# Patient Record
Sex: FEMALE | Race: WHITE
[De-identification: names, ages, dates, MRNs, and addresses within clinical notes are randomized per-mention and may not be internally consistent; named-entity substitution may affect disease eponyms.]

---

## 2017-03-08 ENCOUNTER — PRIOR ORIGINAL RECORDS (OUTPATIENT)
Dept: OTHER | Age: 77
End: 2017-03-08

## 2017-03-21 ENCOUNTER — HOSPITAL ENCOUNTER (OUTPATIENT)
Dept: CV DIAGNOSTICS | Age: 77
Discharge: HOME | End: 2017-03-21
Attending: INTERNAL MEDICINE
Payer: MEDICARE

## 2017-03-21 ENCOUNTER — MYAURORA ACCOUNT LINK (OUTPATIENT)
Dept: OTHER | Age: 77
End: 2017-03-21

## 2017-03-21 DIAGNOSIS — I65.21 OCCLUSION OF RIGHT CAROTID ARTERY: ICD-10-CM

## 2017-03-29 ENCOUNTER — PRIOR ORIGINAL RECORDS (OUTPATIENT)
Dept: OTHER | Age: 77
End: 2017-03-29

## 2017-03-30 ENCOUNTER — PRIOR ORIGINAL RECORDS (OUTPATIENT)
Dept: OTHER | Age: 77
End: 2017-03-30

## 2017-04-04 ENCOUNTER — PRIOR ORIGINAL RECORDS (OUTPATIENT)
Dept: OTHER | Age: 77
End: 2017-04-04

## 2017-04-05 ENCOUNTER — PRIOR ORIGINAL RECORDS (OUTPATIENT)
Dept: OTHER | Age: 77
End: 2017-04-05

## 2017-04-05 ENCOUNTER — LAB ENCOUNTER (OUTPATIENT)
Dept: LAB | Age: 77
End: 2017-04-05
Attending: INTERNAL MEDICINE
Payer: MEDICARE

## 2017-04-05 DIAGNOSIS — E78.00 HYPERCHOLESTEREMIA: ICD-10-CM

## 2017-04-05 DIAGNOSIS — I77.9 CAROTID ARTERY DISEASE (HCC): Primary | ICD-10-CM

## 2017-04-05 DIAGNOSIS — R73.9 HYPERGLYCEMIA: ICD-10-CM

## 2017-04-05 PROCEDURE — 80061 LIPID PANEL: CPT

## 2017-04-05 PROCEDURE — 80053 COMPREHEN METABOLIC PANEL: CPT

## 2017-04-05 PROCEDURE — 36415 COLL VENOUS BLD VENIPUNCTURE: CPT

## 2017-04-13 LAB
ALBUMIN: 4.1 G/DL
ALKALINE PHOSPHATATE(ALK PHOS): 74 IU/L
BILIRUBIN TOTAL: 0.5 MG/DL
BUN: 11 MG/DL
CALCIUM: 9.2 MG/DL
CHLORIDE: 104 MEQ/L
CHOLESTEROL, TOTAL: 144 MG/DL
CREATININE, SERUM: 0.7 MG/DL
GLUCOSE: 101 MG/DL
HDL CHOLESTEROL: 62 MG/DL
LDL CHOLESTEROL: 59 MG/DL
POTASSIUM, SERUM: 3.7 MEQ/L
PROTEIN, TOTAL: 7.6 G/DL
SGOT (AST): 30 IU/L
SGPT (ALT): 32 IU/L
SODIUM: 141 MEQ/L
TRIGLYCERIDES: 115 MG/DL

## 2017-05-08 ENCOUNTER — PRIOR ORIGINAL RECORDS (OUTPATIENT)
Dept: OTHER | Age: 77
End: 2017-05-08

## 2017-06-28 ENCOUNTER — PRIOR ORIGINAL RECORDS (OUTPATIENT)
Dept: OTHER | Age: 77
End: 2017-06-28

## 2017-06-29 ENCOUNTER — HOSPITAL ENCOUNTER (OUTPATIENT)
Dept: MAMMOGRAPHY | Age: 77
Discharge: HOME | End: 2017-06-29
Attending: OBSTETRICS & GYNECOLOGY
Payer: MEDICARE

## 2017-06-29 DIAGNOSIS — Z12.31 VISIT FOR SCREENING MAMMOGRAM: ICD-10-CM

## 2017-06-29 PROCEDURE — 77067 SCR MAMMO BI INCL CAD: CPT | Performed by: OBSTETRICS & GYNECOLOGY

## 2017-07-10 ENCOUNTER — HOSPITAL ENCOUNTER (OUTPATIENT)
Dept: MAMMOGRAPHY | Age: 77
Discharge: HOME | End: 2017-07-10
Attending: OBSTETRICS & GYNECOLOGY
Payer: MEDICARE

## 2017-07-10 DIAGNOSIS — R92.2 INCONCLUSIVE MAMMOGRAM: ICD-10-CM

## 2017-07-10 PROCEDURE — 77061 BREAST TOMOSYNTHESIS UNI: CPT | Performed by: OBSTETRICS & GYNECOLOGY

## 2017-07-10 PROCEDURE — 77065 DX MAMMO INCL CAD UNI: CPT | Performed by: OBSTETRICS & GYNECOLOGY

## 2017-07-10 NOTE — IMAGING NOTE
Assisted  with Recommendation for a 2 site stereotactic right breast biopsy for calcifications. Emotional and educational support provided. Written information provided to [employer]  and she verbalized understanding.

## 2017-07-11 ENCOUNTER — PRIOR ORIGINAL RECORDS (OUTPATIENT)
Dept: OTHER | Age: 77
End: 2017-07-11

## 2017-07-11 ENCOUNTER — TELEPHONE (OUTPATIENT)
Dept: MAMMOGRAPHY | Facility: HOSPITAL | Age: 77
End: 2017-07-11

## 2017-07-11 NOTE — TELEPHONE ENCOUNTER
LM with Dr. Breanna Caba office regarding Holding ASA for a 2 site stereotactic breast biopsy. We recommend holding 5 days before and 2 days after if this is amenable to him? Awaiting call back in order to schedule.

## 2017-07-11 NOTE — TELEPHONE ENCOUNTER
Per Chris Brenner, NP with Cardiology (Dr. Mae Brown) \"pt may stop ASA for 3 days before biopsy and restart ASAP. \"  Per Bernadine Figueredo with our scheduling department, we are still waiting on breast biopsy orders.  I notified pt of above and LM asking her to contact her PCP f

## 2017-07-13 ENCOUNTER — PRIOR ORIGINAL RECORDS (OUTPATIENT)
Dept: OTHER | Age: 77
End: 2017-07-13

## 2017-07-31 ENCOUNTER — HOSPITAL ENCOUNTER (OUTPATIENT)
Dept: MAMMOGRAPHY | Facility: HOSPITAL | Age: 77
Discharge: HOME | End: 2017-07-31
Attending: OBSTETRICS & GYNECOLOGY
Payer: MEDICARE

## 2017-07-31 DIAGNOSIS — R92.1 BREAST CALCIFICATIONS: ICD-10-CM

## 2017-07-31 PROCEDURE — 19081 BX BREAST 1ST LESION STRTCTC: CPT | Performed by: OBSTETRICS & GYNECOLOGY

## 2017-07-31 PROCEDURE — 88305 TISSUE EXAM BY PATHOLOGIST: CPT

## 2017-07-31 PROCEDURE — 19082 BX BREAST ADD LESION STRTCTC: CPT | Performed by: OBSTETRICS & GYNECOLOGY

## 2017-07-31 PROCEDURE — 88360 TUMOR IMMUNOHISTOCHEM/MANUAL: CPT

## 2017-07-31 NOTE — PROCEDURES
PROCEDURE: [facility] BIOPSY STEREOTACTIC W/CALC 2 SITE RIGHT    COMPARISON: [place], [facility] STEPHANIE 2D+3D DIAGNOSTIC ADDL VWS RIGHT (KVQ=[id number]), 7/10/2017, 9:16.     INDICATIONS: R92.1 Mammographic calcification found on diagnostic imaging of breast    DESCRIP

## 2017-08-02 ENCOUNTER — TELEPHONE (OUTPATIENT)
Dept: MAMMOGRAPHY | Facility: HOSPITAL | Age: 77
End: 2017-08-02

## 2017-08-02 NOTE — TELEPHONE ENCOUNTER
Telephoned [address] and name,  verified with pt. Notified [facility] [address] [facility] of Right ST breast biopsy result of 2 areas of DCIS. Emotional support given. Pt sts she's glad she had the breast checkup done to find this early.   Pt's GYN/order

## 2017-08-03 ENCOUNTER — TELEPHONE (OUTPATIENT)
Dept: HEMATOLOGY/ONCOLOGY | Facility: HOSPITAL | Age: 77
End: 2017-08-03

## 2017-08-03 NOTE — TELEPHONE ENCOUNTER
Patient and her Daughter in [place] phoned to schedule breast clinic appointments for newly diagnosed DCIS. Introduced myself and explained my role as the breast navigator.  Explained the role of the physicians on her breast cancer care team.  Briefly

## 2017-08-08 ENCOUNTER — OFFICE VISIT (OUTPATIENT)
Dept: SURGERY | Facility: CLINIC | Age: 77
End: 2017-08-08

## 2017-08-08 ENCOUNTER — OFFICE VISIT (OUTPATIENT)
Dept: HEMATOLOGY/ONCOLOGY | Facility: HOSPITAL | Age: 77
End: 2017-08-08
Attending: INTERNAL MEDICINE
Payer: MEDICARE

## 2017-08-08 VITALS
BODY MASS INDEX: 22.54 KG/M2 | SYSTOLIC BLOOD PRESSURE: 157 MMHG | HEART RATE: 75 BPM | TEMPERATURE: 98 F | HEIGHT: 63 IN | WEIGHT: 127.19 LBS | DIASTOLIC BLOOD PRESSURE: 66 MMHG | OXYGEN SATURATION: 98 % | RESPIRATION RATE: 16 BRPM

## 2017-08-08 DIAGNOSIS — D05.11 DUCTAL CARCINOMA IN SITU (DCIS) OF RIGHT BREAST: Primary | ICD-10-CM

## 2017-08-08 DIAGNOSIS — Z80.3 FAMILY HISTORY OF BREAST CANCER IN FEMALE: ICD-10-CM

## 2017-08-08 PROCEDURE — 99211 OFF/OP EST MAY X REQ PHY/QHP: CPT

## 2017-08-08 PROCEDURE — 99204 OFFICE O/P NEW MOD 45 MIN: CPT | Performed by: SURGERY

## 2017-08-08 RX ORDER — GUARN/MA-HUANG/P.GIN/S.GINSENG
1 TABLET ORAL DAILY
COMMUNITY

## 2017-08-08 RX ORDER — EZETIMIBE AND SIMVASTATIN 10; 20 MG/1; MG/1
1 TABLET ORAL NIGHTLY
COMMUNITY
End: 2018-03-20

## 2017-08-08 RX ORDER — AMLODIPINE BESYLATE 5 MG/1
5 TABLET ORAL DAILY
Status: ON HOLD | COMMUNITY
End: 2018-03-21

## 2017-08-08 RX ORDER — GLUCOSAMINE/D3/BOSWELLIA SERRA 1500MG-400
10000 TABLET ORAL DAILY
COMMUNITY

## 2017-08-08 RX ORDER — DOXEPIN HYDROCHLORIDE 50 MG/1
1 CAPSULE ORAL DAILY
COMMUNITY

## 2017-08-09 ENCOUNTER — TELEPHONE (OUTPATIENT)
Dept: SURGERY | Facility: CLINIC | Age: 77
End: 2017-08-09

## 2017-08-09 DIAGNOSIS — D05.11 DUCTAL CARCINOMA IN SITU (DCIS) OF RIGHT BREAST: Primary | ICD-10-CM

## 2017-08-10 ENCOUNTER — PRIOR ORIGINAL RECORDS (OUTPATIENT)
Dept: OTHER | Age: 77
End: 2017-08-10

## 2017-08-10 RX ORDER — LIDOCAINE AND PRILOCAINE 25; 25 MG/G; MG/G
CREAM TOPICAL ONCE
Status: CANCELLED | OUTPATIENT
Start: 2017-08-10 | End: 2017-08-10

## 2017-08-14 ENCOUNTER — APPOINTMENT (OUTPATIENT)
Dept: LAB | Age: 77
End: 2017-08-14
Payer: MEDICARE

## 2017-08-14 DIAGNOSIS — D05.11 DUCTAL CARCINOMA IN SITU (DCIS) OF RIGHT BREAST: ICD-10-CM

## 2017-08-14 LAB
ATRIAL RATE: 62 BPM
BUN BLD-MCNC: 11 MG/DL (ref 8–20)
CALCIUM BLD-MCNC: 9 MG/DL (ref 8.3–10.3)
CHLORIDE: 105 MMOL/L (ref 101–111)
CO2: 28 MMOL/L (ref 22–32)
CREAT BLD-MCNC: 0.65 MG/DL (ref 0.55–1.02)
GLUCOSE BLD-MCNC: 103 MG/DL (ref 70–99)
P AXIS: 56 DEGREES
P-R INTERVAL: 184 MS
POTASSIUM SERPL-SCNC: 3.8 MMOL/L (ref 3.6–5.1)
Q-T INTERVAL: 406 MS
QRS DURATION: 82 MS
QTC CALCULATION (BEZET): 412 MS
R AXIS: -22 DEGREES
SODIUM SERPL-SCNC: 140 MMOL/L (ref 136–144)
T AXIS: 75 DEGREES
VENTRICULAR RATE: 62 BPM

## 2017-08-14 PROCEDURE — 93010 ELECTROCARDIOGRAM REPORT: CPT | Performed by: INTERNAL MEDICINE

## 2017-08-14 PROCEDURE — 93005 ELECTROCARDIOGRAM TRACING: CPT

## 2017-08-14 PROCEDURE — 80048 BASIC METABOLIC PNL TOTAL CA: CPT

## 2017-08-14 PROCEDURE — 36415 COLL VENOUS BLD VENIPUNCTURE: CPT

## 2017-08-23 ENCOUNTER — TELEPHONE (OUTPATIENT)
Dept: MAMMOGRAPHY | Facility: HOSPITAL | Age: 77
End: 2017-08-23

## 2017-08-23 NOTE — TELEPHONE ENCOUNTER
Spoke with Vanessa Currie regarding [place] Lymph Node mapping which will be done in nuclear medicine department before right mastectomy surgery scheduled for 8/25/17 with Dr Julianne Hill. Procedure explained and all questions answered.   Breast Care Coordin

## 2017-08-25 ENCOUNTER — ANESTHESIA EVENT (OUTPATIENT)
Dept: SURGERY | Facility: HOSPITAL | Age: 77
End: 2017-08-25
Payer: MEDICARE

## 2017-08-25 ENCOUNTER — ANESTHESIA (OUTPATIENT)
Dept: SURGERY | Facility: HOSPITAL | Age: 77
End: 2017-08-25
Payer: MEDICARE

## 2017-08-25 ENCOUNTER — HOSPITAL ENCOUNTER (OUTPATIENT)
Dept: NUCLEAR MEDICINE | Facility: HOSPITAL | Age: 77
Discharge: HOME | End: 2017-08-25
Attending: SURGERY
Payer: MEDICARE

## 2017-08-25 ENCOUNTER — SURGERY (OUTPATIENT)
Age: 77
End: 2017-08-25

## 2017-08-25 ENCOUNTER — HOSPITAL ENCOUNTER (OUTPATIENT)
Facility: HOSPITAL | Age: 77
Discharge: HOME | End: 2017-08-26
Attending: SURGERY | Admitting: SURGERY
Payer: MEDICARE

## 2017-08-25 DIAGNOSIS — D05.11 DUCTAL CARCINOMA IN SITU (DCIS) OF RIGHT BREAST: Primary | ICD-10-CM

## 2017-08-25 DIAGNOSIS — D05.11 DUCTAL CARCINOMA IN SITU (DCIS) OF RIGHT BREAST: ICD-10-CM

## 2017-08-25 PROCEDURE — 99204 OFFICE O/P NEW MOD 45 MIN: CPT | Performed by: HOSPITALIST

## 2017-08-25 PROCEDURE — 0HTT0ZZ RESECTION OF RIGHT BREAST, OPEN APPROACH: ICD-10-PCS | Performed by: SURGERY

## 2017-08-25 PROCEDURE — 07B50ZX EXCISION OF RIGHT AXILLARY LYMPHATIC, OPEN APPROACH, DIAGNOSTIC: ICD-10-PCS | Performed by: SURGERY

## 2017-08-25 PROCEDURE — 78195 LYMPH SYSTEM IMAGING: CPT | Performed by: SURGERY

## 2017-08-25 PROCEDURE — 3E0W3KZ INTRODUCTION OF OTHER DIAGNOSTIC SUBSTANCE INTO LYMPHATICS, PERCUTANEOUS APPROACH: ICD-10-PCS | Performed by: SURGERY

## 2017-08-25 RX ORDER — DOCUSATE SODIUM 100 MG/1
100 CAPSULE, LIQUID FILLED ORAL 2 TIMES DAILY
Status: DISCONTINUED | OUTPATIENT
Start: 2017-08-25 | End: 2017-08-26

## 2017-08-25 RX ORDER — ONDANSETRON 2 MG/ML
4 INJECTION INTRAMUSCULAR; INTRAVENOUS EVERY 6 HOURS PRN
Status: DISCONTINUED | OUTPATIENT
Start: 2017-08-25 | End: 2017-08-26

## 2017-08-25 RX ORDER — SODIUM PHOSPHATE, DIBASIC AND SODIUM PHOSPHATE, MONOBASIC 7; 19 G/133ML; G/133ML
1 ENEMA RECTAL ONCE AS NEEDED
Status: DISCONTINUED | OUTPATIENT
Start: 2017-08-25 | End: 2017-08-26

## 2017-08-25 RX ORDER — HYDROMORPHONE HYDROCHLORIDE 1 MG/ML
0.4 INJECTION, SOLUTION INTRAMUSCULAR; INTRAVENOUS; SUBCUTANEOUS EVERY 2 HOUR PRN
Status: DISCONTINUED | OUTPATIENT
Start: 2017-08-25 | End: 2017-08-26

## 2017-08-25 RX ORDER — NALOXONE HYDROCHLORIDE 0.4 MG/ML
80 INJECTION, SOLUTION INTRAMUSCULAR; INTRAVENOUS; SUBCUTANEOUS AS NEEDED
Status: DISCONTINUED | OUTPATIENT
Start: 2017-08-25 | End: 2017-08-25 | Stop reason: HOSPADM

## 2017-08-25 RX ORDER — DEXAMETHASONE SODIUM PHOSPHATE 4 MG/ML
4 VIAL (ML) INJECTION AS NEEDED
Status: DISCONTINUED | OUTPATIENT
Start: 2017-08-25 | End: 2017-08-25 | Stop reason: HOSPADM

## 2017-08-25 RX ORDER — BISACODYL 10 MG
10 SUPPOSITORY, RECTAL RECTAL
Status: DISCONTINUED | OUTPATIENT
Start: 2017-08-25 | End: 2017-08-26

## 2017-08-25 RX ORDER — SODIUM CHLORIDE, SODIUM LACTATE, POTASSIUM CHLORIDE, CALCIUM CHLORIDE 600; 310; 30; 20 MG/100ML; MG/100ML; MG/100ML; MG/100ML
INJECTION, SOLUTION INTRAVENOUS CONTINUOUS
Status: DISCONTINUED | OUTPATIENT
Start: 2017-08-25 | End: 2017-08-25

## 2017-08-25 RX ORDER — ACETAMINOPHEN 325 MG/1
650 TABLET ORAL EVERY 4 HOURS PRN
Status: DISCONTINUED | OUTPATIENT
Start: 2017-08-25 | End: 2017-08-26

## 2017-08-25 RX ORDER — HYDROCODONE BITARTRATE AND ACETAMINOPHEN 5; 325 MG/1; MG/1
1 TABLET ORAL EVERY 4 HOURS PRN
Status: DISCONTINUED | OUTPATIENT
Start: 2017-08-25 | End: 2017-08-26

## 2017-08-25 RX ORDER — MAGNESIUM OXIDE 400 MG (241.3 MG MAGNESIUM) TABLET
1 TABLET NIGHTLY
Status: DISCONTINUED | OUTPATIENT
Start: 2017-08-25 | End: 2017-08-26

## 2017-08-25 RX ORDER — HYDROMORPHONE HYDROCHLORIDE 1 MG/ML
0.8 INJECTION, SOLUTION INTRAMUSCULAR; INTRAVENOUS; SUBCUTANEOUS EVERY 2 HOUR PRN
Status: DISCONTINUED | OUTPATIENT
Start: 2017-08-25 | End: 2017-08-26

## 2017-08-25 RX ORDER — LIDOCAINE AND PRILOCAINE 25; 25 MG/G; MG/G
CREAM TOPICAL ONCE
Status: COMPLETED | OUTPATIENT
Start: 2017-08-25 | End: 2017-08-25

## 2017-08-25 RX ORDER — POLYETHYLENE GLYCOL 3350 17 G/17G
17 POWDER, FOR SOLUTION ORAL DAILY PRN
Status: DISCONTINUED | OUTPATIENT
Start: 2017-08-25 | End: 2017-08-26

## 2017-08-25 RX ORDER — HEPARIN SODIUM 5000 [USP'U]/ML
5000 INJECTION, SOLUTION INTRAVENOUS; SUBCUTANEOUS ONCE
Status: COMPLETED | OUTPATIENT
Start: 2017-08-25 | End: 2017-08-25

## 2017-08-25 RX ORDER — HYDROCODONE BITARTRATE AND ACETAMINOPHEN 5; 325 MG/1; MG/1
2 TABLET ORAL EVERY 4 HOURS PRN
Status: DISCONTINUED | OUTPATIENT
Start: 2017-08-25 | End: 2017-08-26

## 2017-08-25 RX ORDER — BUPIVACAINE HYDROCHLORIDE 5 MG/ML
INJECTION, SOLUTION EPIDURAL; INTRACAUDAL AS NEEDED
Status: DISCONTINUED | OUTPATIENT
Start: 2017-08-25 | End: 2017-08-25 | Stop reason: HOSPADM

## 2017-08-25 RX ORDER — HYDROMORPHONE HYDROCHLORIDE 1 MG/ML
0.4 INJECTION, SOLUTION INTRAMUSCULAR; INTRAVENOUS; SUBCUTANEOUS EVERY 5 MIN PRN
Status: DISCONTINUED | OUTPATIENT
Start: 2017-08-25 | End: 2017-08-25 | Stop reason: HOSPADM

## 2017-08-25 RX ORDER — AMLODIPINE BESYLATE 5 MG/1
5 TABLET ORAL DAILY
Status: DISCONTINUED | OUTPATIENT
Start: 2017-08-26 | End: 2017-08-26

## 2017-08-25 RX ORDER — ONDANSETRON 2 MG/ML
4 INJECTION INTRAMUSCULAR; INTRAVENOUS AS NEEDED
Status: DISCONTINUED | OUTPATIENT
Start: 2017-08-25 | End: 2017-08-25 | Stop reason: HOSPADM

## 2017-08-25 RX ORDER — SODIUM CHLORIDE 9 MG/ML
INJECTION, SOLUTION INTRAVENOUS CONTINUOUS
Status: DISCONTINUED | OUTPATIENT
Start: 2017-08-25 | End: 2017-08-26

## 2017-08-25 RX ORDER — LABETALOL HYDROCHLORIDE 5 MG/ML
5 INJECTION, SOLUTION INTRAVENOUS EVERY 5 MIN PRN
Status: DISCONTINUED | OUTPATIENT
Start: 2017-08-25 | End: 2017-08-25 | Stop reason: HOSPADM

## 2017-08-25 RX ORDER — LIDOCAINE HYDROCHLORIDE 10 MG/ML
INJECTION, SOLUTION INFILTRATION; PERINEURAL AS NEEDED
Status: DISCONTINUED | OUTPATIENT
Start: 2017-08-25 | End: 2017-08-25 | Stop reason: HOSPADM

## 2017-08-25 RX ORDER — CLINDAMYCIN PHOSPHATE 900 MG/50ML
900 INJECTION INTRAVENOUS ONCE
Status: DISCONTINUED | OUTPATIENT
Start: 2017-08-25 | End: 2017-08-25 | Stop reason: HOSPADM

## 2017-08-25 RX ORDER — ISOSULFAN BLUE 50 MG/5ML
INJECTION, SOLUTION SUBCUTANEOUS AS NEEDED
Status: DISCONTINUED | OUTPATIENT
Start: 2017-08-25 | End: 2017-08-25 | Stop reason: HOSPADM

## 2017-08-25 RX ORDER — DIAZEPAM 5 MG/1
5 TABLET ORAL AS NEEDED
Status: DISCONTINUED | OUTPATIENT
Start: 2017-08-25 | End: 2017-08-25 | Stop reason: HOSPADM

## 2017-08-25 RX ORDER — HYDROMORPHONE HYDROCHLORIDE 1 MG/ML
0.2 INJECTION, SOLUTION INTRAMUSCULAR; INTRAVENOUS; SUBCUTANEOUS EVERY 2 HOUR PRN
Status: DISCONTINUED | OUTPATIENT
Start: 2017-08-25 | End: 2017-08-26

## 2017-08-25 NOTE — INTERVAL H&P NOTE
Pre-op Diagnosis: Ductal carcinoma in situ (DCIS) of right breast [D05.11]    The above referenced H&P was reviewed by Katy Billings MD on 8/25/2017, the patient was examined and no significant changes have occurred in the patient's condition since the [facility] (DP/SNF)

## 2017-08-25 NOTE — BRIEF OP NOTE
Pre-Operative Diagnosis: Ductal carcinoma in situ (DCIS) of right breast [D05.11]     Post-Operative Diagnosis: Ductal carcinoma in situ (DCIS) of right breast [D05.11]- 3 sentinel LN negatice     Procedure Performed:   Procedure(s):  RIGHT BREAST MASTE

## 2017-08-25 NOTE — ANESTHESIA POSTPROCEDURE EVALUATION
[address] Patient Status:  Outpatient in a Bed   Age/Gender 69year old female MRN [id number]   Location [address] [facility] Attending Serenity Deal MD   Hosp Day # 0 PCP Rosa Barber       Anesthesia

## 2017-08-25 NOTE — H&P
History & Physical Examination    Patient Name: Naldo Segal  MRN: [id number]  [facility]: [id number]  YOB: 1940    Diagnosis: right breast cancer    Present Illness: The pt is a 69year old female recetly diagnosed with right breast cancer.   Mark Parekh Essential hypertension    • Familial tremor    • Hyperlipidemia    • Osteoarthritis    • Visual impairment      Past Surgical History:  No date: HYSTERECTOMY  1990'S: DARREL BIOPSY STEREOTACTIC NODULE 1 SITE RIGHT      Comment: BENIGN  No date: TONSILLECTOMY

## 2017-08-25 NOTE — IMAGING NOTE
Assisted nuclear med tech with SLN mapping of right breast for Malou Shabazz today. Procedure explained and Malou Shabazz verbalized understanding. Emotional support provided. Written and verbal education given re lymphedema and breast cancer.

## 2017-08-25 NOTE — OPERATIVE REPORT
[facility]   Op Note    [facility] Location: OR   [facility] [id number] MRN [id number]   Admission Date 8/25/2017 Operation Date 8/25/2017   Attending Physician Sho Herron MD Operating Physician Brigida Houston MD     Date of procedure:   August medical oncology to discuss adjuvant treatment options including hormone therapy. Reconstruction options were also reviewed in detail and at this time Steven Dodge has declined all reconstruction options.   The patient does not have any further questions at t maximal impulse. The subcutaneous tissue was dissected down with electrocautery and a self-retaining retractor was placed into the wound.   The blue dye staining and the gamma probe were used to identify the sentinel lymph nodes and these were sent for fro

## 2017-08-25 NOTE — ANESTHESIA PREPROCEDURE EVALUATION
PRE-OP EVALUATION    Patient Name: Juana Mora    Pre-op Diagnosis: Ductal carcinoma in situ (DCIS) of right breast [D05.11]    Procedure(s):  RIGHT BREAST MASTECTOMY, SENTINEL LYMPH NODE BIOPSY, POSSIBLE TOTAL AXILLARY LYMPH NODE DISSECTION, [place] [facility]    Negative pulmonary ROS. Neuro/Psych    Negative neuro/psych ROS.                           Breast ca      Past Surgical History:  No date: HYSTERECTOMY  1990'S: DARREL BIOPSY STEREOTACTIC NODULE 1 SITE RIGHT      Comment: B

## 2017-08-26 VITALS
DIASTOLIC BLOOD PRESSURE: 42 MMHG | BODY MASS INDEX: 21.44 KG/M2 | SYSTOLIC BLOOD PRESSURE: 114 MMHG | HEART RATE: 71 BPM | TEMPERATURE: 98 F | WEIGHT: 121 LBS | RESPIRATION RATE: 18 BRPM | OXYGEN SATURATION: 98 % | HEIGHT: 63 IN

## 2017-08-26 LAB
BASOPHILS # BLD AUTO: 0 X10(3) UL (ref 0–0.1)
BASOPHILS NFR BLD AUTO: 0 %
BUN BLD-MCNC: 11 MG/DL (ref 8–20)
CALCIUM BLD-MCNC: 8.8 MG/DL (ref 8.3–10.3)
CHLORIDE: 107 MMOL/L (ref 101–111)
CO2: 26 MMOL/L (ref 22–32)
CREAT BLD-MCNC: 0.52 MG/DL (ref 0.55–1.02)
EOSINOPHIL # BLD AUTO: 0 X10(3) UL (ref 0–0.3)
EOSINOPHIL NFR BLD AUTO: 0 %
ERYTHROCYTE [DISTWIDTH] IN BLOOD BY AUTOMATED COUNT: 13.2 % (ref 11.5–16)
GLUCOSE BLD-MCNC: 126 MG/DL (ref 70–99)
HCT VFR BLD AUTO: 33.2 % (ref 34–50)
HGB BLD-MCNC: 10.9 G/DL (ref 12–16)
IMMATURE GRANULOCYTE COUNT: 0.04 X10(3) UL (ref 0–1)
IMMATURE GRANULOCYTE RATIO %: 0.6 %
LYMPHOCYTES # BLD AUTO: 0.96 X10(3) UL (ref 0.9–4)
LYMPHOCYTES NFR BLD AUTO: 13.9 %
MCH RBC QN AUTO: 28.1 PG (ref 27–33.2)
MCHC RBC AUTO-ENTMCNC: 32.8 G/DL (ref 31–37)
MCV RBC AUTO: 85.6 FL (ref 81–100)
MONOCYTES # BLD AUTO: 0.26 X10(3) UL (ref 0.1–0.6)
MONOCYTES NFR BLD AUTO: 3.8 %
NEUTROPHIL ABS PRELIM: 5.66 X10 (3) UL (ref 1.3–6.7)
NEUTROPHILS # BLD AUTO: 5.66 X10(3) UL (ref 1.3–6.7)
NEUTROPHILS NFR BLD AUTO: 81.7 %
PLATELET # BLD AUTO: 225 10(3)UL (ref 150–450)
POTASSIUM SERPL-SCNC: 4 MMOL/L (ref 3.6–5.1)
RBC # BLD AUTO: 3.88 X10(6)UL (ref 3.8–5.1)
RED CELL DISTRIBUTION WIDTH-SD: 40.9 FL (ref 35.1–46.3)
SODIUM SERPL-SCNC: 139 MMOL/L (ref 136–144)
WBC # BLD AUTO: 6.9 X10(3) UL (ref 4–13)

## 2017-08-26 PROCEDURE — 99213 OFFICE O/P EST LOW 20 MIN: CPT | Performed by: HOSPITALIST

## 2017-08-26 RX ORDER — HYDROCODONE BITARTRATE AND ACETAMINOPHEN 5; 325 MG/1; MG/1
1 TABLET ORAL EVERY 4 HOURS PRN
Qty: 30 TABLET | Refills: 0 | Status: SHIPPED | OUTPATIENT
Start: 2017-08-26 | End: 2017-09-01 | Stop reason: ALTCHOICE

## 2017-08-26 NOTE — CONSULTS
[place] Patient Status:  Outpatient in a Bed    1940 MRN [id number]   [facility] 3NW-A Attending Keith Chavez MD   Hosp Day # 0 PCP Brandon Munguia     Reason for consult: Hypertension Disp:  Rfl:    AmLODIPine Besylate 5 MG Oral Tab Take 5 mg by mouth daily. Disp:  Rfl:    B Complex Vitamins (VITAMIN B COMPLEX OR) Inject as directed. Disp:  Rfl:    Biotin 44312 MCG Oral Tab Take by mouth.  Disp:  Rfl:    Calcium 600-200 MG-UNIT Oral Tab hypertension  1. Norvasc  3. Dyslipidemia  1. Vytorin    Quality:  · DVT Prophylaxis: SCDs    Plan of care discussed with patient and family.     Kevan Antonio MD  8/25/2017

## 2017-08-26 NOTE — CM/SW NOTE
DHAVAL received a page indicating the pt had questions regarding her dc. Pt stated she did not have an needs and her daughter would be there fin 30 to 40 minutes. Pt stated she would need bras because of her SX, but stated she knows how to obtain them.  DHAVAL berger

## 2017-08-26 NOTE — PROGRESS NOTES
[facility]  Progress Note     Elise Morales Patient Status:  Observation    1940 MRN [id number]   [facility] 3NW-A Attending Markus Pop MD   Hosp Day # 0 PCP Martin Alejandro     Chief Complaint: HTN, medical managemen no  · Central line: no    Estimated date of discharge: 8/26  Discharge is dependent on: progress  At this point Ms. Kizzy Wells is expected to be discharge to: home    Plan of care discussed with patient and RN.     Tony Flaherty MD

## 2017-08-26 NOTE — PAYOR COMM NOTE
--------------  ADMISSION REVIEW           8/25    DIRECT FOR OR    Pre-Operative Diagnosis: Ductal carcinoma in situ (DCIS) of right breast [D05.11]     Post-Operative Diagnosis: Ductal carcinoma in situ (DCIS) of right breast [D05.11]- 3 sentinel LN nega

## 2017-08-28 NOTE — PAYOR COMM NOTE
Per Surgical Scheduling Order:  ID: [id number]  Performing Location:  [address] [place] [address] OR  Performing Provider:  Barbie Wilkerson  Patient Class:   Outpatient in a Bed [123]  Date:  8/25/2017  Laterality:  Right [2]  Procedure Comments:  RIGHT BREAST MASTECTOMY, SENTI

## 2017-09-01 ENCOUNTER — OFFICE VISIT (OUTPATIENT)
Dept: SURGERY | Facility: CLINIC | Age: 77
End: 2017-09-01

## 2017-09-01 VITALS
RESPIRATION RATE: 16 BRPM | BODY MASS INDEX: 22.5 KG/M2 | DIASTOLIC BLOOD PRESSURE: 80 MMHG | HEART RATE: 62 BPM | SYSTOLIC BLOOD PRESSURE: 156 MMHG | WEIGHT: 127 LBS | HEIGHT: 63 IN

## 2017-09-01 DIAGNOSIS — Z80.3 FAMILY HISTORY OF BREAST CANCER IN FEMALE: ICD-10-CM

## 2017-09-01 DIAGNOSIS — Z90.11 S/P RIGHT MASTECTOMY: ICD-10-CM

## 2017-09-01 DIAGNOSIS — D05.11 DUCTAL CARCINOMA IN SITU (DCIS) OF RIGHT BREAST: Primary | ICD-10-CM

## 2017-09-01 PROCEDURE — 99024 POSTOP FOLLOW-UP VISIT: CPT | Performed by: SURGERY

## 2017-09-14 ENCOUNTER — OFFICE VISIT (OUTPATIENT)
Dept: SURGERY | Facility: CLINIC | Age: 77
End: 2017-09-14

## 2017-09-14 VITALS
HEIGHT: 63 IN | HEART RATE: 68 BPM | BODY MASS INDEX: 21.62 KG/M2 | WEIGHT: 122 LBS | TEMPERATURE: 98 F | SYSTOLIC BLOOD PRESSURE: 145 MMHG | DIASTOLIC BLOOD PRESSURE: 73 MMHG

## 2017-09-14 DIAGNOSIS — Z85.3 HISTORY OF CANCER OF RIGHT BREAST: Primary | ICD-10-CM

## 2017-09-14 DIAGNOSIS — Z90.11 S/P RIGHT MASTECTOMY: ICD-10-CM

## 2017-09-14 PROCEDURE — 99024 POSTOP FOLLOW-UP VISIT: CPT | Performed by: SURGERY

## 2017-09-15 NOTE — PROGRESS NOTES
GENERAL SURGERY    Khurram Roper MD, FACS, Antolin Baez. Juana Mendoza MD, Ethel Ponce MD, FACS  Shannon Johnson.  Keysha Will MD, Altaftad Mcgillllor, MD, ANTHONY Lopez PA-C         [address] [facility]

## 2017-09-17 PROBLEM — Z80.3 FAMILY HISTORY OF BREAST CANCER IN FEMALE: Status: ACTIVE | Noted: 2017-09-17

## 2017-09-17 NOTE — H&P
New Patient  Johnna Ludwig  5/8/1940 8/8/2017    This patient was referred by Debo Rivera for evaluation and consultation for new diagnosis of right breast cancer.     Chief Complaint:   Right breast cancer  Breast Pain: In the area of recent biop REPRODUCTIVE HISTORY:    Menarche:  16years old   Menopause :   Hysterectomy at the age of 26 for \"abnormal cells\" patient still has her ovaries  G/P:     Age at first live birth:   13years old  Breast Feeding:   No due to inverted nipples History   Problem Relation Age of Onset   • Lung Cancer [OTHER] Brother    • Multiple Myeloma [OTHER] Brother    • Breast Cancer Paternal Aunt 76       Objective/Physical Exam:    General: Alert, orientated. Cooperative. Vital Signs:   There were no vit outcomes/recovery and alternatives to any proposed surgery were also fully reviewed with the patient. Any proposed surgical procedure was described in detail.   The need for other consultation including medical oncology, radiation oncology, plastic surgery scheduling. Preoperative medical and cardiac clearance will be requested for general anesthesia. Plan:     Right mastectomy with sentinel lymph node biopsy for multifocal DCIS    Thank you for allowing me to participate in your patient's care.

## 2017-09-19 NOTE — PROGRESS NOTES
GENERAL SURGERY    Leonora Elise MD, FACS, Kelley Amaro. Rubina George MD, Don Clayton MD, FACS  Erwin Enciso.  Lisset Sparks MD, Jose Anderson MD, ANTHONY Lopez PA-C         [address] [facility]

## 2017-10-16 ENCOUNTER — LAB ENCOUNTER (OUTPATIENT)
Dept: LAB | Age: 77
End: 2017-10-16
Attending: FAMILY MEDICINE
Payer: MEDICARE

## 2017-10-16 DIAGNOSIS — R73.9 HYPERGLYCEMIA: Primary | ICD-10-CM

## 2017-10-16 DIAGNOSIS — E78.00 PURE HYPERCHOLESTEROLEMIA: ICD-10-CM

## 2017-10-16 PROCEDURE — 36415 COLL VENOUS BLD VENIPUNCTURE: CPT

## 2017-10-16 PROCEDURE — 80053 COMPREHEN METABOLIC PANEL: CPT

## 2017-10-16 PROCEDURE — 80061 LIPID PANEL: CPT

## 2017-10-17 ENCOUNTER — APPOINTMENT (OUTPATIENT)
Dept: HEMATOLOGY/ONCOLOGY | Facility: HOSPITAL | Age: 77
End: 2017-10-17
Attending: INTERNAL MEDICINE
Payer: MEDICARE

## 2017-10-26 ENCOUNTER — OFFICE VISIT (OUTPATIENT)
Dept: SURGERY | Facility: CLINIC | Age: 77
End: 2017-10-26

## 2017-10-26 VITALS
DIASTOLIC BLOOD PRESSURE: 69 MMHG | HEIGHT: 63 IN | HEART RATE: 75 BPM | BODY MASS INDEX: 21.62 KG/M2 | SYSTOLIC BLOOD PRESSURE: 141 MMHG | WEIGHT: 122 LBS

## 2017-10-26 DIAGNOSIS — Z80.3 FAMILY HISTORY OF BREAST CANCER IN FEMALE: Primary | ICD-10-CM

## 2017-10-26 DIAGNOSIS — Z90.11 S/P RIGHT MASTECTOMY: ICD-10-CM

## 2017-10-26 DIAGNOSIS — Z85.3 HISTORY OF CANCER OF RIGHT BREAST: ICD-10-CM

## 2017-10-26 DIAGNOSIS — M79.89 SWELLING OF UPPER ARM: ICD-10-CM

## 2017-10-26 PROCEDURE — 99024 POSTOP FOLLOW-UP VISIT: CPT | Performed by: SURGERY

## 2017-10-29 ENCOUNTER — HOSPITAL ENCOUNTER (EMERGENCY)
Facility: HOSPITAL | Age: 77
Discharge: HOME | End: 2017-10-29
Attending: EMERGENCY MEDICINE
Payer: MEDICARE

## 2017-10-29 VITALS
SYSTOLIC BLOOD PRESSURE: 151 MMHG | WEIGHT: 122 LBS | BODY MASS INDEX: 21.62 KG/M2 | OXYGEN SATURATION: 96 % | HEART RATE: 66 BPM | HEIGHT: 63 IN | TEMPERATURE: 98 F | DIASTOLIC BLOOD PRESSURE: 66 MMHG | RESPIRATION RATE: 16 BRPM

## 2017-10-29 DIAGNOSIS — R04.0 EPISTAXIS: Primary | ICD-10-CM

## 2017-10-29 PROBLEM — M79.89 SWELLING OF UPPER ARM: Status: ACTIVE | Noted: 2017-10-29

## 2017-10-29 PROCEDURE — 99283 EMERGENCY DEPT VISIT LOW MDM: CPT

## 2017-10-29 PROCEDURE — 85730 THROMBOPLASTIN TIME PARTIAL: CPT | Performed by: EMERGENCY MEDICINE

## 2017-10-29 PROCEDURE — 85610 PROTHROMBIN TIME: CPT | Performed by: EMERGENCY MEDICINE

## 2017-10-29 PROCEDURE — 80053 COMPREHEN METABOLIC PANEL: CPT | Performed by: EMERGENCY MEDICINE

## 2017-10-29 PROCEDURE — 36415 COLL VENOUS BLD VENIPUNCTURE: CPT

## 2017-10-29 PROCEDURE — 85025 COMPLETE CBC W/AUTO DIFF WBC: CPT | Performed by: EMERGENCY MEDICINE

## 2017-10-29 NOTE — PROGRESS NOTES
GENERAL SURGERY    Todd Bruner MD, FACS, Bee Ye. Sarai Cotto MD, Lena Rogel MD, FACS  Raegan Maynard.  Aram Huizar MD, Nickie Rosenthal MD, ANTHONY Lopez PA-C         [address] [facility]

## 2017-10-30 ENCOUNTER — PRIOR ORIGINAL RECORDS (OUTPATIENT)
Dept: OTHER | Age: 77
End: 2017-10-30

## 2017-10-30 ENCOUNTER — HOSPITAL ENCOUNTER (EMERGENCY)
Facility: HOSPITAL | Age: 77
Discharge: HOME | End: 2017-10-30
Attending: EMERGENCY MEDICINE
Payer: MEDICARE

## 2017-10-30 VITALS
BODY MASS INDEX: 21.62 KG/M2 | DIASTOLIC BLOOD PRESSURE: 87 MMHG | SYSTOLIC BLOOD PRESSURE: 173 MMHG | WEIGHT: 122 LBS | TEMPERATURE: 97 F | HEART RATE: 71 BPM | OXYGEN SATURATION: 99 % | RESPIRATION RATE: 18 BRPM | HEIGHT: 63 IN

## 2017-10-30 DIAGNOSIS — R04.0 EPISTAXIS: Primary | ICD-10-CM

## 2017-10-30 PROCEDURE — 99283 EMERGENCY DEPT VISIT LOW MDM: CPT

## 2017-10-30 NOTE — ED INITIAL ASSESSMENT (HPI)
Reported nosebleed. Per patient she was watching TV when the nosebleed suddenly started. Denies any recent trauma. Denies cough. Denies blowing nose.  Patient said she attempted to stop the bleeding for 10 minutes, then called 911 after the nosebleed would

## 2017-10-30 NOTE — ED PROVIDER NOTES
Patient Seen in: [facility] Emergency Department    History   Patient presents with:  Nose Bleed (nasopharyngeal)    Stated Complaint: Right side nose bleed    HPI    Patient is a 60-year-old female comes emergency room for evaluation of right-sided 161/65  Pulse: 55  Resp: 16  Temp: (!) 97.1 °F (36.2 °C)  Temp src: Temporal  SpO2: 98 %  O2 Device: None (Room air)    Current:BP (!) 173/87   Pulse 71   Temp (!) 97.1 °F (36.2 °C) (Temporal)   Resp 18   Ht 160 cm (5' 3\")   Wt 55.3 kg   SpO2 99%   BMI 21 the ER for worsening, concerning, new, changing or persisting symptoms. I discussed the case with the patient and they had no questions, complaints, or concerns. Patient felt comfortable going home.           Disposition and Plan     Clinical Impression:

## 2017-10-31 NOTE — ED PROVIDER NOTES
Patient Seen in: [facility] Emergency Department    History   Patient presents with:  Nose Bleed (nasopharyngeal)    Stated Complaint: nosebleed-resolved    HPI    60-year-old female presents to the emergency department with complaints of epistaxis °C)  Temp src: Temporal  SpO2: 98 %  O2 Device: None (Room air)    Current:/66   Pulse 66   Temp 98.1 °F (36.7 °C) (Temporal)   Resp 16   Ht 160 cm (5' 3\")   Wt 55.3 kg   SpO2 96%   BMI 21.61 kg/m²         Physical Exam   Constitutional: She is orie tests on the individual orders.    RAINBOW DRAW BLUE   RAINBOW DRAW LAVENDER   RAINBOW DRAW LIGHT GREEN   RAINBOW DRAW GOLD       ============================================================  ED Course  ------------------------------------------------------

## 2017-11-01 ENCOUNTER — HOSPITAL ENCOUNTER (EMERGENCY)
Facility: HOSPITAL | Age: 77
Discharge: HOME | End: 2017-11-01
Attending: EMERGENCY MEDICINE
Payer: MEDICARE

## 2017-11-01 VITALS
DIASTOLIC BLOOD PRESSURE: 66 MMHG | TEMPERATURE: 98 F | HEART RATE: 69 BPM | OXYGEN SATURATION: 96 % | SYSTOLIC BLOOD PRESSURE: 139 MMHG | RESPIRATION RATE: 18 BRPM

## 2017-11-01 DIAGNOSIS — R04.0 NOSEBLEED: Primary | ICD-10-CM

## 2017-11-01 PROCEDURE — 99282 EMERGENCY DEPT VISIT SF MDM: CPT

## 2017-11-01 NOTE — ED PROVIDER NOTES
Patient Seen in: [facility] Emergency Department    History   Patient presents with:  Nose Bleed (nasopharyngeal)    Stated Complaint: nose bleed    HPI    69-year-old female presents emergency department complaining of a nosebleed.   Patient states in no acute distress  HEENT: Pupils equal react to light extraocular muscles intact no scleral icterus, mucous membranes are moist, there is no erythema or exudate in the posterior pharynx, patient has a small spot in the right inner nare against the septu

## 2017-11-16 ENCOUNTER — OFFICE VISIT (OUTPATIENT)
Dept: SURGERY | Facility: CLINIC | Age: 77
End: 2017-11-16

## 2017-11-16 VITALS
SYSTOLIC BLOOD PRESSURE: 159 MMHG | TEMPERATURE: 98 F | HEIGHT: 63 IN | BODY MASS INDEX: 21.62 KG/M2 | DIASTOLIC BLOOD PRESSURE: 84 MMHG | WEIGHT: 122 LBS | HEART RATE: 75 BPM

## 2017-11-16 DIAGNOSIS — Z80.3 FAMILY HISTORY OF BREAST CANCER IN FEMALE: ICD-10-CM

## 2017-11-16 DIAGNOSIS — M79.89 SWELLING OF UPPER ARM: ICD-10-CM

## 2017-11-16 DIAGNOSIS — Z85.3 HISTORY OF CANCER OF RIGHT BREAST: Primary | ICD-10-CM

## 2017-11-16 DIAGNOSIS — D05.11 DUCTAL CARCINOMA IN SITU (DCIS) OF RIGHT BREAST: ICD-10-CM

## 2017-11-16 DIAGNOSIS — Z90.11 S/P RIGHT MASTECTOMY: ICD-10-CM

## 2017-11-16 PROCEDURE — 99024 POSTOP FOLLOW-UP VISIT: CPT | Performed by: SURGERY

## 2017-11-16 RX ORDER — INFLUENZA A VIRUSA/MICHIGAN/45/2015 X-275 (H1N1) ANTIGEN (FORMALDEHYDE INACTIVATED), INFLUENZA A VIRUS A/HONG KONG/4801/2014 X-263B (H3N2) ANTIGEN (FORMALDEHYDE INACTIVATED), AND INFLUENZA B VIRUS B/BRISBANE/60/2008 ANTIGEN (FORMALDEHYDE INACTIVATED) 60; 60; 60 UG/.5ML; UG/.5ML; UG/.5ML
INJECTION, SUSPENSION INTRAMUSCULAR
Refills: 0 | COMMUNITY
Start: 2017-09-19 | End: 2018-03-20

## 2017-11-16 NOTE — PROGRESS NOTES
GENERAL SURGERY    Jun Larios MD, FACS, Oswaldo Tomas. Unique Kennedy MD, Steffany Zeng MD, FACS  Jamie Wilson.  Selina Fitzgerald MD, Deepti Martinez MD, ANTHONY Lopez PA-C         [address] [facility] Follow Up:    Dawn Miguel will proceed with a six-month follow-up mammogram in January 2018. She will see me after mammogram is completed for repeat examination.   In the interim she will continue monthly self-examination of the left breast.    Thank you for

## 2018-02-22 ENCOUNTER — HOSPITAL ENCOUNTER (OUTPATIENT)
Dept: MAMMOGRAPHY | Age: 78
Discharge: HOME | End: 2018-02-22
Attending: SURGERY
Payer: MEDICARE

## 2018-02-22 ENCOUNTER — PRIOR ORIGINAL RECORDS (OUTPATIENT)
Dept: OTHER | Age: 78
End: 2018-02-22

## 2018-02-22 ENCOUNTER — LAB ENCOUNTER (OUTPATIENT)
Dept: LAB | Age: 78
End: 2018-02-22
Attending: INTERNAL MEDICINE
Payer: MEDICARE

## 2018-02-22 DIAGNOSIS — Z85.3 HISTORY OF CANCER OF RIGHT BREAST: ICD-10-CM

## 2018-02-22 DIAGNOSIS — E78.00 PURE HYPERCHOLESTEROLEMIA: Primary | ICD-10-CM

## 2018-02-22 LAB
ALBUMIN SERPL-MCNC: 4.1 G/DL (ref 3.5–4.8)
ALP LIVER SERPL-CCNC: 77 U/L (ref 55–142)
ALT SERPL-CCNC: 26 U/L (ref 14–54)
AST SERPL-CCNC: 21 U/L (ref 15–41)
BILIRUB SERPL-MCNC: 0.6 MG/DL (ref 0.1–2)
BUN BLD-MCNC: 12 MG/DL (ref 8–20)
CALCIUM BLD-MCNC: 9.4 MG/DL (ref 8.3–10.3)
CHLORIDE: 104 MMOL/L (ref 101–111)
CHOLEST SMN-MCNC: 130 MG/DL (ref ?–200)
CO2: 28 MMOL/L (ref 22–32)
CREAT BLD-MCNC: 0.71 MG/DL (ref 0.55–1.02)
GLUCOSE BLD-MCNC: 99 MG/DL (ref 70–99)
HDLC SERPL-MCNC: 55 MG/DL (ref 45–?)
HDLC SERPL: 2.36 {RATIO} (ref ?–4.44)
LDLC SERPL CALC-MCNC: 52 MG/DL (ref ?–130)
M PROTEIN MFR SERPL ELPH: 7.7 G/DL (ref 6.1–8.3)
NONHDLC SERPL-MCNC: 75 MG/DL (ref ?–130)
POTASSIUM SERPL-SCNC: 3.8 MMOL/L (ref 3.6–5.1)
SODIUM SERPL-SCNC: 140 MMOL/L (ref 136–144)
TRIGL SERPL-MCNC: 115 MG/DL (ref ?–150)
VLDLC SERPL CALC-MCNC: 23 MG/DL (ref 5–40)

## 2018-02-22 PROCEDURE — 80061 LIPID PANEL: CPT

## 2018-02-22 PROCEDURE — 77065 DX MAMMO INCL CAD UNI: CPT | Performed by: SURGERY

## 2018-02-22 PROCEDURE — 80053 COMPREHEN METABOLIC PANEL: CPT

## 2018-02-22 PROCEDURE — 36415 COLL VENOUS BLD VENIPUNCTURE: CPT

## 2018-02-22 PROCEDURE — 77061 BREAST TOMOSYNTHESIS UNI: CPT | Performed by: SURGERY

## 2018-02-26 LAB
ALBUMIN: 4.1 G/DL
ALKALINE PHOSPHATATE(ALK PHOS): 77 IU/L
BILIRUBIN TOTAL: 0.6 MG/DL
BUN: 12 MG/DL
CALCIUM: 9.4 MG/DL
CHLORIDE: 104 MEQ/L
CHOLESTEROL, TOTAL: 130 MG/DL
CREATININE, SERUM: 0.71 MG/DL
GLUCOSE: 99 MG/DL
HDL CHOLESTEROL: 55 MG/DL
LDL CHOLESTEROL: 52 MG/DL
POTASSIUM, SERUM: 3.8 MEQ/L
PROTEIN, TOTAL: 7.7 G/DL
SGOT (AST): 21 IU/L
SGPT (ALT): 26 IU/L
SODIUM: 140 MEQ/L
TRIGLYCERIDES: 115 MG/DL

## 2018-03-05 ENCOUNTER — TELEPHONE (OUTPATIENT)
Dept: SURGERY | Facility: CLINIC | Age: 78
End: 2018-03-05

## 2018-03-14 ENCOUNTER — PRIOR ORIGINAL RECORDS (OUTPATIENT)
Dept: OTHER | Age: 78
End: 2018-03-14

## 2018-03-20 ENCOUNTER — APPOINTMENT (OUTPATIENT)
Dept: GENERAL RADIOLOGY | Facility: HOSPITAL | Age: 78
End: 2018-03-20
Attending: EMERGENCY MEDICINE
Payer: MEDICARE

## 2018-03-20 ENCOUNTER — APPOINTMENT (OUTPATIENT)
Dept: MRI IMAGING | Facility: HOSPITAL | Age: 78
End: 2018-03-20
Attending: EMERGENCY MEDICINE
Payer: MEDICARE

## 2018-03-20 ENCOUNTER — APPOINTMENT (OUTPATIENT)
Dept: CT IMAGING | Facility: HOSPITAL | Age: 78
End: 2018-03-20
Attending: EMERGENCY MEDICINE
Payer: MEDICARE

## 2018-03-20 ENCOUNTER — HOSPITAL ENCOUNTER (OUTPATIENT)
Facility: HOSPITAL | Age: 78
Setting detail: OBSERVATION
LOS: 1 days | Discharge: HOME | End: 2018-03-21
Attending: EMERGENCY MEDICINE | Admitting: INTERNAL MEDICINE
Payer: MEDICARE

## 2018-03-20 DIAGNOSIS — R42 DIZZINESS: Primary | ICD-10-CM

## 2018-03-20 DIAGNOSIS — I65.21 STENOSIS OF RIGHT CAROTID ARTERY: ICD-10-CM

## 2018-03-20 PROCEDURE — 70498 CT ANGIOGRAPHY NECK: CPT | Performed by: EMERGENCY MEDICINE

## 2018-03-20 PROCEDURE — 99223 1ST HOSP IP/OBS HIGH 75: CPT | Performed by: INTERNAL MEDICINE

## 2018-03-20 PROCEDURE — 71045 X-RAY EXAM CHEST 1 VIEW: CPT | Performed by: EMERGENCY MEDICINE

## 2018-03-20 PROCEDURE — 70496 CT ANGIOGRAPHY HEAD: CPT | Performed by: EMERGENCY MEDICINE

## 2018-03-20 PROCEDURE — 99223 1ST HOSP IP/OBS HIGH 75: CPT | Performed by: OTHER

## 2018-03-20 PROCEDURE — 70553 MRI BRAIN STEM W/O & W/DYE: CPT | Performed by: EMERGENCY MEDICINE

## 2018-03-20 RX ORDER — ENOXAPARIN SODIUM 100 MG/ML
40 INJECTION SUBCUTANEOUS DAILY
Status: DISCONTINUED | OUTPATIENT
Start: 2018-03-20 | End: 2018-03-21

## 2018-03-20 RX ORDER — ONDANSETRON 2 MG/ML
4 INJECTION INTRAMUSCULAR; INTRAVENOUS EVERY 4 HOURS PRN
Status: DISCONTINUED | OUTPATIENT
Start: 2018-03-20 | End: 2018-03-20 | Stop reason: ALTCHOICE

## 2018-03-20 RX ORDER — ASPIRIN 81 MG/1
324 TABLET, CHEWABLE ORAL ONCE
Status: COMPLETED | OUTPATIENT
Start: 2018-03-20 | End: 2018-03-20

## 2018-03-20 RX ORDER — ACETAMINOPHEN 325 MG/1
650 TABLET ORAL EVERY 6 HOURS PRN
Status: DISCONTINUED | OUTPATIENT
Start: 2018-03-20 | End: 2018-03-21

## 2018-03-20 RX ORDER — FLUTICASONE PROPIONATE 50 MCG
1 SPRAY, SUSPENSION (ML) NASAL DAILY
COMMUNITY
End: 2019-05-15

## 2018-03-20 RX ORDER — ONDANSETRON 2 MG/ML
4 INJECTION INTRAMUSCULAR; INTRAVENOUS EVERY 6 HOURS PRN
Status: DISCONTINUED | OUTPATIENT
Start: 2018-03-20 | End: 2018-03-21

## 2018-03-20 RX ORDER — SODIUM CHLORIDE 9 MG/ML
INJECTION, SOLUTION INTRAVENOUS CONTINUOUS
Status: DISCONTINUED | OUTPATIENT
Start: 2018-03-20 | End: 2018-03-21

## 2018-03-20 RX ORDER — LORAZEPAM 2 MG/ML
0.5 INJECTION INTRAMUSCULAR ONCE
Status: COMPLETED | OUTPATIENT
Start: 2018-03-20 | End: 2018-03-20

## 2018-03-20 RX ORDER — ASPIRIN 325 MG
325 TABLET, DELAYED RELEASE (ENTERIC COATED) ORAL 2 TIMES DAILY
Status: DISCONTINUED | OUTPATIENT
Start: 2018-03-20 | End: 2018-03-21

## 2018-03-20 RX ORDER — DEXTROSE AND SODIUM CHLORIDE 5; .9 G/100ML; G/100ML
INJECTION, SOLUTION INTRAVENOUS ONCE
Status: COMPLETED | OUTPATIENT
Start: 2018-03-20 | End: 2018-03-20

## 2018-03-20 RX ORDER — EZETIMIBE AND SIMVASTATIN 10; 40 MG/1; MG/1
1 TABLET ORAL NIGHTLY
COMMUNITY

## 2018-03-20 RX ORDER — SODIUM CHLORIDE 9 MG/ML
INJECTION, SOLUTION INTRAVENOUS CONTINUOUS
Status: ACTIVE | OUTPATIENT
Start: 2018-03-20 | End: 2018-03-20

## 2018-03-20 RX ORDER — ONDANSETRON 2 MG/ML
4 INJECTION INTRAMUSCULAR; INTRAVENOUS ONCE
Status: COMPLETED | OUTPATIENT
Start: 2018-03-20 | End: 2018-03-20

## 2018-03-20 NOTE — ED INITIAL ASSESSMENT (HPI)
Pt states she finished her aerobics class and got very dizzy and its getting worse, states has a hx of vertigo

## 2018-03-20 NOTE — PROGRESS NOTES
[address]  Neurology Note    [facility] Patient Status:  Emergency    1940 MRN [id number]   Location [facility] Attending Leamon Epley, MD   [facility] Day # 0 PCP Kj HERNANDEZ     REASON FOR EVALUATION: No date: HYSTERECTOMY  1990'S: DARREL BIOPSY STEREOTACTIC NODULE 1 SITE RIGHT      Comment: BENIGN  07/2017: DARREL BIOPSY STEREOTACTIC W/CALC 3 SITE RIGHT      Comment: DCIS  08/25/2017: MASTECTOMY RIGHT  No date: TONSILLECTOMY    FAMILY HISTORY:  family histor Extremities: no edema, peripheral pulses intact  Neurologic:   Mental status: This patient is alert and orientated to person, place, time  Speech fluent, comprehension intact. Attention/concentration: Normal, able to follow commands.     Face is symmetrica MRI BRAIN showed no acute infarction  Continue antiplatelet therapy and statin therapy  Will need to consider CEA vs Stenting  Stroke risk factors management  Keep BP in normal range, avoid hypotensive events due to severe carotid stenosis  Will follow

## 2018-03-20 NOTE — HISTORICAL OFFICE NOTE
Roberto Royal  : 1940  ACCOUNT:  [id number]  [phone]  PCP: Dr. Ce Fontenot     TODAY'S DATE: 2018  DICTATED BY:  [Dr. Fransisco Busby: [Followup of Hypercholesteremia, pure and Followup of Hypertension.]    HPI: conjunctivae not injected and no xanthelasma. ENT: mucosa pink and moist. NECK: jugular venous pressure not elevated. RESP: respirations with normal rate and rhythm, clear to auscultation. GI: no masses, tenderness or hepatosplenomegaly, rectal deferred.  Baby Hack 1MG       one daily                                11/25/15 Multivitamins                   one daily                                11/25/15 Nasonex               50MCG/AC  as directed                              11/25/15 Vitamin B Complex a greater than or equal to 70% right internal carotid artery stenosis. The stenosis in the external carotid artery is less than 70% with an ECA/CCA of less than 4.   There is antegrade flow in the right vertebral artery and normal flow in the right subclav

## 2018-03-20 NOTE — ED PROVIDER NOTES
Patient Seen in: [facility] Emergency Department    History   Patient presents with:  Dizziness (neurologic)    Stated Complaint: dizziness, denies n/v/d, fever    HPI    Patient is a 60-year-old female who presents emergency room with a history of [03/20/18 1053]  BP: 156/73  Pulse: 77  Resp: 18  Temp: 98.2 °F (36.8 °C)  Temp src: n/a  SpO2: 100 %  O2 Device: n/a    Current:/56   Pulse 74   Temp 98.2 °F (36.8 °C)   Resp 16   Ht 160 cm (5' 3\")   Wt 56.7 kg   SpO2 99%   BMI 22.14 kg/m² Therapeutic Range is approximately 65- 104 seconds. The therapeutic range has been validated against 0.3-0.7 heparin anti-Xa units/mL. TROPONIN I - Normal   PROTHROMBIN TIME (PT) - Normal   CBC WITH DIFFERENTIAL WITH PLATELET    Narrative:      The foll Patient's coags are unremarkable. The patient was given IV fluid, IV Zofran and IV Ativan in the emergency room and felt significantly better after these were given.   Patient's CTA findings are as noted above which appear to show a significant change from

## 2018-03-20 NOTE — H&P
[facility]                                                               History & Physical         [facility] Patient Status:  Emergency    1940 MRN [id number]   Location [facility] Attending No att. provid that she quit smoking about 37 years ago. She has a 20.00 pack-year smoking history. She has never used smokeless tobacco. She reports that she does not drink alcohol or use drugs.     Allergies:    Erythromycin            Anaphylaxis    Comment:Swelling in 0.99 03/20/2018   PTP 13.5 03/20/2018   TROP <0.046 03/20/2018       Recent Labs   Lab  03/20/18   1101   PTP  13.5   INR  0.99       Recent Labs   Lab  03/20/18   1101   TROP  <0.046     Imaging:   MRI BRAIN (W+WO) (CPT=70553)     COMPARISON:  [facility] , CT nonspecific finding may be related to remote punctate hemorrhages from amyloid angiography.           Dictated by: Luciano Nicholson MD on 3/20/2018 at 15:36         CTA BRAIN + CTA CAROTIDS (LEX=[id number])     COMPARISON:  [facility], US CAROTID DOPPLER BILA and superior cerebellar arteries are unremarkable. The basilar artery has a normal course and caliber. The bilateral vertebral arteries are unremarkable. Origin of   the dominant left PICA is noted. There is a 3-vessel aortic arch.   The origins of t Heart and mediastinum are normal.  Lungs clear. Mild bilateral apical pleural scars.     =====  CONCLUSION:  No acute process.               Dictated by: Jose Mejia MD on 3/20/2018 at 11:55       Approved by: Jose Mejia MD         ASSESSMENT / Mary Beth Verona

## 2018-03-21 ENCOUNTER — PRIOR ORIGINAL RECORDS (OUTPATIENT)
Dept: OTHER | Age: 78
End: 2018-03-21

## 2018-03-21 VITALS
HEIGHT: 63 IN | RESPIRATION RATE: 20 BRPM | HEART RATE: 65 BPM | OXYGEN SATURATION: 98 % | DIASTOLIC BLOOD PRESSURE: 76 MMHG | WEIGHT: 125 LBS | BODY MASS INDEX: 22.15 KG/M2 | SYSTOLIC BLOOD PRESSURE: 141 MMHG | TEMPERATURE: 98 F

## 2018-03-21 PROCEDURE — 99239 HOSP IP/OBS DSCHRG MGMT >30: CPT | Performed by: HOSPITALIST

## 2018-03-21 PROCEDURE — 99233 SBSQ HOSP IP/OBS HIGH 50: CPT | Performed by: OTHER

## 2018-03-21 RX ORDER — AMLODIPINE BESYLATE 5 MG/1
5 TABLET ORAL DAILY
Qty: 30 TABLET | Refills: 0 | Status: SHIPPED | OUTPATIENT
Start: 2018-03-21

## 2018-03-21 RX ORDER — POLYVINYL ALCOHOL 14 MG/ML
1 SOLUTION/ DROPS OPHTHALMIC 4 TIMES DAILY PRN
Status: DISCONTINUED | OUTPATIENT
Start: 2018-03-21 | End: 2018-03-21

## 2018-03-21 RX ORDER — POTASSIUM CHLORIDE 20 MEQ/1
40 TABLET, EXTENDED RELEASE ORAL EVERY 4 HOURS
Status: COMPLETED | OUTPATIENT
Start: 2018-03-21 | End: 2018-03-21

## 2018-03-21 NOTE — PLAN OF CARE
Assumed care of patient at 0730  AOx4, RA, NSR on tele  Denies pain  Denies dizziness  Tolerating diet with good appetite  IVF infusing per order  K+ replaced per protocol  Vascular to eval today  Up with SBA  Patient updated on POC  Will continue to monit

## 2018-03-21 NOTE — CONSULTS
Mary Boyd, [address] [facility]  [address] [facility]     VASCULAR SURGERY CONSULT NOTE      Name: Kevyn Gomes   :   1940  [id number]     REFERRING PHYSICIAN: Neo Moody •  Polyvinyl Alcohol (LIQUI-TEARS) 1.4 % ophthalmic solution 1 drop, 1 drop, Ophthalmic, QID PRN  •  0.9%  NaCl infusion, , Intravenous, Continuous  •  Enoxaparin Sodium (LOVENOX) 40 MG/0.4ML injection 40 mg, 40 mg, Subcutaneous, Daily  •  acetaminophen (T Right 2+       palpable, strong palpable, strong       none   Left 2+       palpable, strong palpable, strong       none        no sensory or motor deficits      Diagnostic Data:      LABS:  Recent Labs   Lab  03/20/18   1101  03/21/18   0521   WBC  8.5  5 PROCEDURE:  MRI BRAIN (W+WO) (CPT=70553)  COMPARISON:  [facility] , CTA BRAIN + CTA CAROTIDS (LJL=[id number]), 3/20/2018, 12:27.   INDICATIONS:  dizziness, carotid stenosis  TECHNIQUE:  MRI of the brain was performed with multi-planar T1, T2-weighted images wi Result Date: 3/20/2018  PROCEDURE:  XR CHEST AP PORTABLE  (CPT=71045)  TECHNIQUE:  AP chest radiograph was obtained. COMPARISON:  BROWN CHEST PA   LATERAL, 11/09/2011, 10:42.   INDICATIONS:  dizziness, denies n/v/d, fever  PATIENT STATED HISTORY: (A CONCLUSION:   BIRADS Code 3: PROBABLY BENIGN FINDING. RECOMMENDATIONS:  FOLLOW-UP: LEFT BREAST. Six-month mammographic follow-up is recommended.    (Patient will be notified by Radiology.)   A letter explaining the results in lay terms has been sent to th PROCEDURE:  CTA BRAIN + CTA CAROTIDS (VEI=[id number])  COMPARISON:  [facility], US CAROTID DOPPLER [facility] (CPT=93880), 11/18/2015, 9:10.   INDICATIONS:  dizziness, denies n/v/d, fever  TECHNIQUE:  CT angiography of the head and neck were obtained left PICA is noted. There is a 3-vessel aortic arch. The origins of the branch vessels appear widely patent. The bilateral subclavian arteries and innominate artery are unremarkable.   Partially calcified atherosclerotic plaque in the very distal right c The patient is a 69year old female who has has severe asymptomatic right carotid artery stenosis who was presented to the hospital with weakness that has improved after hydration. The patient denies any further symptoms at present.   She has no specific n

## 2018-03-21 NOTE — PLAN OF CARE
Assumed care at 1900. No acute issues overnight. Denies dizziness. Oriented x 4  RA  NSR/SB on tele. IVF per order. Vascular consult pending.   CARDIOVASCULAR - ADULT    • Maintains optimal cardiac output and hemodynamic stability Progressing

## 2018-03-21 NOTE — PROGRESS NOTES
[address] [facility] Progress Note    Nancy Ocasio Patient Status:  Inpatient    1940 MRN [id number]   [facility] 7NE-A Attending Soni Bess MD   Hosp Day # 1 PCP Pamela Potter     Subjective:  Nancy Ocasio 03/21/2018   CO2 24.0 03/21/2018   GLU 88 03/21/2018   CA 8.3 03/21/2018   ALB 3.9 03/20/2018   ALKPHO 73 03/20/2018   BILT 0.4 03/20/2018   TP 7.2 03/20/2018   AST 21 03/20/2018   ALT 27 03/20/2018   PTT 24.5 03/20/2018   INR 0.99 03/20/2018   PTP 13.5 03 in normal range, avoid hypotensive events due to severe carotid stenosis   mg daily (was on 81 mg prior to admission)  Stroke education given  Stroke risk factors management  Ok to [employer], agree elective right CEA per vascular surgery  I discussed with

## 2018-03-21 NOTE — CM/SW NOTE
Patient failed inpatient criteria. Second level of review completed and supports observation. UR committee in agreement. Discussed with Dr. Jhony Shankar who approves observation status. Observation letter given to the patient and order written.

## 2018-03-21 NOTE — PAYOR COMM NOTE
--------------  Order Requisition   Admit to inpatient Once (Order #[id number]) on 3/20/18     ADMISSION REVIEW     Payor: Lucia Mullins [place] #:  [id number]  Authorization Number: [id number]    Admit date: 3/20/18  Admit time: 1813 appreciated. Cranial nerves II through XII are intact. Patient is answering all questions appropriately. Finger to nose exam is normal bilaterally.      Glucose 128 (*)    TROPONIN I - Normal     EKG SR 61 No acute ischemic change noted    CXR: neg     CT focal weakness or numbness. Denies any relieving factors. Denies any associated headache. Denies any chest pain or shortness of breath. Denies abdominal pain.   Patient has history of vertigo in the past.  Patient also history has history of carotid abigail CONSULT:    REASON FOR EVALUATION:  Dizziness following physical exertion     HISTORY OF PRESENT ILLNESS:  Mrs. Jennifer Ordoñez is a 69year old right handed female with a past medical history reviewed below as well as known stenosis of distal right comm dysfunction vs cerebral ischemia  Hx Right Carotid Stenosis - worse since 2015 90%  Hypertension     PLAN:  Hydration  meclizine   MRI BRAIN showed no acute infarction  Continue antiplatelet therapy and statin therapy  Will need to consider CEA vs Stenting Route User    3/21/2018 0924 Given 325 mg Oral Jose Angel Villavicencio RN    3/20/2018 2039 Given 325 mg Oral Amy Krause RN      dextrose 5 % and 0.9 % NaCl infusion     Date Action Dose Route User    3/20/2018 1058 New Bag (none) Intravenous Howard Tabor

## 2018-03-21 NOTE — PROGRESS NOTES
[facility]  Progress Note    James Oshea     Subjective:  No chest pain or shortness of breath. No visual complaints. No focal weakness. Slight dizziness but much improved.        Intake/Output:    Intake/Output Summary (Last 24 hours) at 03/21 0.9%  NaCl infusion  Intravenous Continuous   Enoxaparin Sodium (LOVENOX) 40 MG/0.4ML injection 40 mg 40 mg Subcutaneous Daily   acetaminophen (TYLENOL) tab 650 mg 650 mg Oral Q6H PRN   ondansetron HCl (ZOFRAN) injection 4 mg 4 mg Intravenous Q6H PRN   a

## 2018-03-21 NOTE — PROGRESS NOTES
PRAB[facility]OT HOSPITALIST  Progress Note     Cj Hennessy Patient Status:  Inpatient    1940 MRN [id number]   [facility] 7NE-A Attending Nawaf Maria MD   Hosp Day # 1 PCP Hong Bobby     Chief Complaint: dizziness    S: Patient n stenosis with dizziness  1. Vascular surgery consult  to evaluate for need for carotid endarterectomy  2. Essential hypertension  1. continue home medication Norvasc  3. Hyperlipidemia  1. continue home medication   4.  History of breast cancer in situ stat

## 2018-03-21 NOTE — OCCUPATIONAL THERAPY NOTE
OCCUPATIONAL THERAPY QUICK EVALUATION - INPATIENT    Room Number: 8503/5173-F  Evaluation Date: 3/21/2018     Type of Evaluation: Quick Eval  Presenting Problem: dizziness, carotid stenosis    Physician Order: IP Consult to Occupational Therapy  Reason for active. \"        OBJECTIVE  Precautions: Limb alert - right  Fall Risk: Standard fall risk    WEIGHT BEARING RESTRICTION  Weight Bearing Restriction: None                PAIN ASSESSMENT  Ratin          COGNITION  intact    RANGE OF MOTION AND STRENGTH discharge back home.        Patient Complexity  Occupational Profile/Medical History  LOW - Brief history including review of medical or therapy records    Specific performance deficits impacting engagement in ADL/IADL  LOW  1 - 3 performance deficits    Cl

## 2018-03-21 NOTE — PAYOR COMM NOTE
--------------  STATUS CHANGED TO OBSERVATION 3/21 AS A MEDICARE [id number]    Order Requisition   Place in observation Once (Order #[id number]) on 3/21/18     CONTINUED STAY REVIEW    Payor: [facility] Paul Mullins [place] #:  [id number]  Authorization Num

## 2018-03-21 NOTE — PHYSICAL THERAPY NOTE
PHYSICAL THERAPY QUICK EVALUATION - INPATIENT    Room Number: 2808/6606-W  Evaluation Date: 3/21/2018  Presenting Problem: dizziness  Physician Order: PT Eval and Treat     History:  Admitted with dizziness after aerobics, no improvement with meclizine a evaluation    Lower extremity ROM is within functional limits     Lower extremity strength is within functional limits Right Hip flexion  4/5  Left Hip flexion  4/5  Right Knee extension  4+/5  Left Knee extension  4+/5  Right Dorsiflexion  4+/5  Left Dors present    ASSESSMENT   Patient is a 69year old female admitted on 3/20/2018 for dizziness. Pertinent comorbidities and personal factors impacting therapy include Vertigo, breast Ca s/p mastectomy, HTN, OA, R carotid stenosis.   Functional outcome measure

## 2018-03-22 NOTE — DISCHARGE SUMMARY
[facility] HOSPITALIST  DISCHARGE SUMMARY     [facility] Patient Status:  Observation    1940 MRN [id number]   [facility] 7NE-A Attending No att. providers found   Hosp Day # 1 PCP Nalini Juan     Date of Admission: 3/20/2018  D dizziness. This is secondary to symptomatic right carotid artery stenosis which is severe. She seen by cardiology and vascular surgery. She is continued on Norvasc for essential hypertension.   She was seen by vascular who recommended carotid endarterect aspirin 325 MG Tbec              Where to Get Your Medications      These medications were sent to [facility], [phone], [phone]  [place] [address] [facility] [address]

## 2018-03-26 ENCOUNTER — TELEPHONE (OUTPATIENT)
Dept: SURGERY | Facility: CLINIC | Age: 78
End: 2018-03-26

## 2018-03-27 ENCOUNTER — APPOINTMENT (OUTPATIENT)
Dept: HEMATOLOGY/ONCOLOGY | Facility: HOSPITAL | Age: 78
End: 2018-03-27
Attending: SURGERY
Payer: MEDICARE

## 2018-03-27 LAB
ALBUMIN: 3.9 G/DL
ALKALINE PHOSPHATATE(ALK PHOS): 73 IU/L
BILIRUBIN TOTAL: 0.4 MG/DL
BUN: 10 MG/DL
BUN: 13 MG/DL
CALCIUM: 8.3 MG/DL
CALCIUM: 9.1 MG/DL
CHLORIDE: 104 MEQ/L
CHLORIDE: 111 MEQ/L
CREATININE, SERUM: 0.62 MG/DL
CREATININE, SERUM: 0.68 MG/DL
GLUCOSE: 128 MG/DL
GLUCOSE: 88 MG/DL
POTASSIUM, SERUM: 3.6 MEQ/L
POTASSIUM, SERUM: 3.8 MEQ/L
PROTEIN, TOTAL: 7.2 G/DL
SGOT (AST): 21 IU/L
SGPT (ALT): 27 IU/L
SODIUM: 138 MEQ/L
SODIUM: 144 MEQ/L

## 2018-03-27 NOTE — TELEPHONE ENCOUNTER
Spoke to [facility]- she had appt for tomorrow for routine follow up   I called her to re-schedule her appt as it conflicted with scheduled surgery  Idalmis will re schedule for next week Tues April 3 at 1:00pm at the [facility] office on [facility].   Shar Guerra

## 2018-03-28 ENCOUNTER — PRIOR ORIGINAL RECORDS (OUTPATIENT)
Dept: OTHER | Age: 78
End: 2018-03-28

## 2018-03-28 ENCOUNTER — MYAURORA ACCOUNT LINK (OUTPATIENT)
Dept: OTHER | Age: 78
End: 2018-03-28

## 2018-04-02 LAB
HEMATOCRIT: 36.5 %
HEMATOCRIT: 37.7 %
HEMOGLOBIN: 11.3 G/DL
HEMOGLOBIN: 12.4 G/DL
PLATELETS: 217 K/UL
PLATELETS: 230 K/UL
RED BLOOD COUNT: 4.08 X 10-6/U
RED BLOOD COUNT: 4.43 X 10-6/U
WHITE BLOOD COUNT: 5.9 X 10-3/U
WHITE BLOOD COUNT: 8.5 X 10-3/U

## 2018-04-03 ENCOUNTER — OFFICE VISIT (OUTPATIENT)
Dept: SURGERY | Facility: CLINIC | Age: 78
End: 2018-04-03

## 2018-04-03 VITALS
BODY MASS INDEX: 22.15 KG/M2 | TEMPERATURE: 98 F | SYSTOLIC BLOOD PRESSURE: 149 MMHG | HEART RATE: 71 BPM | HEIGHT: 63 IN | WEIGHT: 125 LBS | DIASTOLIC BLOOD PRESSURE: 84 MMHG

## 2018-04-03 DIAGNOSIS — Z80.3 FAMILY HISTORY OF BREAST CANCER IN FEMALE: ICD-10-CM

## 2018-04-03 DIAGNOSIS — Z90.11 S/P RIGHT MASTECTOMY: ICD-10-CM

## 2018-04-03 DIAGNOSIS — Z85.3 PERSONAL HISTORY OF MALIGNANT NEOPLASM OF BREAST: Primary | ICD-10-CM

## 2018-04-03 DIAGNOSIS — D05.11 DUCTAL CARCINOMA IN SITU (DCIS) OF RIGHT BREAST: ICD-10-CM

## 2018-04-03 PROCEDURE — 99213 OFFICE O/P EST LOW 20 MIN: CPT | Performed by: SURGERY

## 2018-04-03 NOTE — PROGRESS NOTES
Follow Up Visit Note       Active Problems      1. Personal history of malignant neoplasm of breast    2. S/P right mastectomy    3. Family history of breast cancer in female    3.  Ductal carcinoma in situ (DCIS) of right breast          Chief Complaint Comment: DCIS  08/25/2017: MASTECTOMY RIGHT  No date: TONSILLECTOMY    The family history and social history have been reviewed by me today.     Family History   Problem Relation Age of Onset   • Lung Cancer [OTHER] Brother    • Multiple Myeloma Mare Footman HENT: Negative for congestion, hearing loss, nosebleeds, sore throat and trouble swallowing. Eyes: Negative for visual disturbance. Respiratory: Negative for apnea, cough, shortness of breath and wheezing.     Cardiovascular: Negative for chest pain, Neurological: She is alert and oriented to person, place, and time. Skin: Skin is warm. No rash noted.             Assessment   Personal history of malignant neoplasm of breast  (primary encounter diagnosis)  S/P right mastectomy  Family history of raina

## 2018-04-09 ENCOUNTER — HOSPITAL ENCOUNTER (OUTPATIENT)
Dept: CV DIAGNOSTICS | Age: 78
Discharge: HOME | End: 2018-04-09
Attending: INTERNAL MEDICINE
Payer: MEDICARE

## 2018-04-09 DIAGNOSIS — I10 ESSENTIAL HYPERTENSION, MALIGNANT: ICD-10-CM

## 2018-04-09 DIAGNOSIS — E78.00 HYPERCHOLESTEREMIA: ICD-10-CM

## 2018-04-09 DIAGNOSIS — Z01.810 PRE-OPERATIVE CARDIOVASCULAR EXAMINATION: ICD-10-CM

## 2018-04-09 PROCEDURE — 78452 HT MUSCLE IMAGE SPECT MULT: CPT | Performed by: INTERNAL MEDICINE

## 2018-04-09 PROCEDURE — 93017 CV STRESS TEST TRACING ONLY: CPT | Performed by: INTERNAL MEDICINE

## 2018-04-09 PROCEDURE — 93018 CV STRESS TEST I&R ONLY: CPT | Performed by: INTERNAL MEDICINE

## 2018-04-16 PROBLEM — I77.9 RIGHT-SIDED CAROTID ARTERY DISEASE (HCC): Status: ACTIVE | Noted: 2018-04-16

## 2018-04-16 PROBLEM — I77.9 RIGHT-SIDED CAROTID ARTERY DISEASE: Status: ACTIVE | Noted: 2018-04-16

## 2018-04-25 ENCOUNTER — LAB ENCOUNTER (OUTPATIENT)
Dept: LAB | Age: 78
End: 2018-04-25
Attending: FAMILY MEDICINE
Payer: MEDICARE

## 2018-04-25 DIAGNOSIS — E55.9 VITAMIN D DEFICIENCY: ICD-10-CM

## 2018-04-25 DIAGNOSIS — E78.00 PURE HYPERCHOLESTEROLEMIA: Primary | ICD-10-CM

## 2018-04-25 DIAGNOSIS — I77.9 DISEASE OF ARTERY (HCC): ICD-10-CM

## 2018-04-25 PROCEDURE — 80061 LIPID PANEL: CPT

## 2018-04-25 PROCEDURE — 36415 COLL VENOUS BLD VENIPUNCTURE: CPT

## 2018-04-25 PROCEDURE — 82306 VITAMIN D 25 HYDROXY: CPT

## 2018-04-25 PROCEDURE — 80053 COMPREHEN METABOLIC PANEL: CPT

## 2018-04-26 NOTE — PROGRESS NOTES
preop instructions given for carotid surgery on May 8th. NPO after midnight, cont will all meds as usual, even the aspirin per Dr. Shaheed Bonds. Arrive at [facility], shower before coming in with an antibacterial soap.   No lotions, or

## 2018-05-02 ENCOUNTER — LAB ENCOUNTER (OUTPATIENT)
Dept: LAB | Age: 78
End: 2018-05-02
Attending: NURSE PRACTITIONER
Payer: MEDICARE

## 2018-05-02 DIAGNOSIS — Z01.818 PRE-OP TESTING: ICD-10-CM

## 2018-05-02 PROCEDURE — 85610 PROTHROMBIN TIME: CPT

## 2018-05-02 PROCEDURE — 85730 THROMBOPLASTIN TIME PARTIAL: CPT

## 2018-05-02 PROCEDURE — 80048 BASIC METABOLIC PNL TOTAL CA: CPT

## 2018-05-02 PROCEDURE — 36415 COLL VENOUS BLD VENIPUNCTURE: CPT

## 2018-05-02 PROCEDURE — 85025 COMPLETE CBC W/AUTO DIFF WBC: CPT

## 2018-05-07 ENCOUNTER — ANESTHESIA EVENT (OUTPATIENT)
Dept: CARDIAC SURGERY | Facility: HOSPITAL | Age: 78
DRG: 039 | End: 2018-05-07
Payer: MEDICARE

## 2018-05-08 ENCOUNTER — ANESTHESIA (OUTPATIENT)
Dept: CARDIAC SURGERY | Facility: HOSPITAL | Age: 78
DRG: 039 | End: 2018-05-08
Payer: MEDICARE

## 2018-05-08 ENCOUNTER — SURGERY (OUTPATIENT)
Age: 78
End: 2018-05-08

## 2018-05-08 ENCOUNTER — HOSPITAL ENCOUNTER (INPATIENT)
Facility: HOSPITAL | Age: 78
LOS: 1 days | Discharge: HOME | DRG: 039 | End: 2018-05-09
Attending: SURGERY | Admitting: SURGERY
Payer: MEDICARE

## 2018-05-08 PROBLEM — I65.29 CAROTID STENOSIS: Status: ACTIVE | Noted: 2018-05-08

## 2018-05-08 PROCEDURE — 86850 RBC ANTIBODY SCREEN: CPT | Performed by: SURGERY

## 2018-05-08 PROCEDURE — 86900 BLOOD TYPING SEROLOGIC ABO: CPT | Performed by: SURGERY

## 2018-05-08 PROCEDURE — 88304 TISSUE EXAM BY PATHOLOGIST: CPT | Performed by: SURGERY

## 2018-05-08 PROCEDURE — 86920 COMPATIBILITY TEST SPIN: CPT

## 2018-05-08 PROCEDURE — 86901 BLOOD TYPING SEROLOGIC RH(D): CPT | Performed by: SURGERY

## 2018-05-08 PROCEDURE — 88311 DECALCIFY TISSUE: CPT | Performed by: SURGERY

## 2018-05-08 PROCEDURE — 87081 CULTURE SCREEN ONLY: CPT | Performed by: SURGERY

## 2018-05-08 PROCEDURE — 03CK0ZZ EXTIRPATION OF MATTER FROM RIGHT INTERNAL CAROTID ARTERY, OPEN APPROACH: ICD-10-PCS | Performed by: SURGERY

## 2018-05-08 PROCEDURE — 03UK0KZ SUPPLEMENT RIGHT INTERNAL CAROTID ARTERY WITH NONAUTOLOGOUS TISSUE SUBSTITUTE, OPEN APPROACH: ICD-10-PCS | Performed by: SURGERY

## 2018-05-08 DEVICE — XENOSURE BIOLOGIC PATCH, 0.8CM X 8CM, EIFU
Type: IMPLANTABLE DEVICE | Site: NECK | Status: FUNCTIONAL
Brand: XENOSURE BIOLOGIC PATCH

## 2018-05-08 RX ORDER — NALOXONE HYDROCHLORIDE 0.4 MG/ML
80 INJECTION, SOLUTION INTRAMUSCULAR; INTRAVENOUS; SUBCUTANEOUS AS NEEDED
Status: ACTIVE | OUTPATIENT
Start: 2018-05-08 | End: 2018-05-08

## 2018-05-08 RX ORDER — BUPIVACAINE HYDROCHLORIDE AND EPINEPHRINE 5; 5 MG/ML; UG/ML
INJECTION, SOLUTION PERINEURAL AS NEEDED
Status: DISCONTINUED | OUTPATIENT
Start: 2018-05-08 | End: 2018-05-08 | Stop reason: HOSPADM

## 2018-05-08 RX ORDER — DOXEPIN HYDROCHLORIDE 50 MG/1
1 CAPSULE ORAL DAILY
Status: DISCONTINUED | OUTPATIENT
Start: 2018-05-08 | End: 2018-05-09

## 2018-05-08 RX ORDER — ACETAMINOPHEN 325 MG/1
650 TABLET ORAL EVERY 4 HOURS PRN
Status: DISCONTINUED | OUTPATIENT
Start: 2018-05-08 | End: 2018-05-09

## 2018-05-08 RX ORDER — HYDROCODONE BITARTRATE AND ACETAMINOPHEN 5; 325 MG/1; MG/1
2 TABLET ORAL EVERY 4 HOURS PRN
Status: DISCONTINUED | OUTPATIENT
Start: 2018-05-08 | End: 2018-05-09

## 2018-05-08 RX ORDER — ASPIRIN 325 MG
325 TABLET, DELAYED RELEASE (ENTERIC COATED) ORAL DAILY
Status: DISCONTINUED | OUTPATIENT
Start: 2018-05-08 | End: 2018-05-08

## 2018-05-08 RX ORDER — MAGNESIUM OXIDE 400 MG (241.3 MG MAGNESIUM) TABLET
1 TABLET NIGHTLY
Status: DISCONTINUED | OUTPATIENT
Start: 2018-05-08 | End: 2018-05-09

## 2018-05-08 RX ORDER — FOLIC ACID/VIT B COMPLEX AND C 400 MCG
1 TABLET ORAL DAILY
Status: DISCONTINUED | OUTPATIENT
Start: 2018-05-08 | End: 2018-05-09

## 2018-05-08 RX ORDER — FLUTICASONE PROPIONATE 50 MCG
1 SPRAY, SUSPENSION (ML) NASAL DAILY
Status: DISCONTINUED | OUTPATIENT
Start: 2018-05-09 | End: 2018-05-09

## 2018-05-08 RX ORDER — HYDROCODONE BITARTRATE AND ACETAMINOPHEN 5; 325 MG/1; MG/1
1 TABLET ORAL EVERY 4 HOURS PRN
Status: DISCONTINUED | OUTPATIENT
Start: 2018-05-08 | End: 2018-05-09

## 2018-05-08 RX ORDER — GLUCOSAMINE/D3/BOSWELLIA SERRA 1500MG-400
10000 TABLET ORAL DAILY
Status: DISCONTINUED | OUTPATIENT
Start: 2018-05-08 | End: 2018-05-08 | Stop reason: RX

## 2018-05-08 RX ORDER — AMLODIPINE BESYLATE 5 MG/1
5 TABLET ORAL DAILY
Status: DISCONTINUED | OUTPATIENT
Start: 2018-05-09 | End: 2018-05-09

## 2018-05-08 RX ORDER — ASPIRIN 325 MG
325 TABLET, DELAYED RELEASE (ENTERIC COATED) ORAL DAILY
Status: DISCONTINUED | OUTPATIENT
Start: 2018-05-08 | End: 2018-05-09

## 2018-05-08 RX ORDER — SODIUM CHLORIDE, SODIUM LACTATE, POTASSIUM CHLORIDE, CALCIUM CHLORIDE 600; 310; 30; 20 MG/100ML; MG/100ML; MG/100ML; MG/100ML
INJECTION, SOLUTION INTRAVENOUS CONTINUOUS
Status: DISCONTINUED | OUTPATIENT
Start: 2018-05-08 | End: 2018-05-09

## 2018-05-08 RX ORDER — ENOXAPARIN SODIUM 100 MG/ML
40 INJECTION SUBCUTANEOUS DAILY
Status: DISCONTINUED | OUTPATIENT
Start: 2018-05-08 | End: 2018-05-09

## 2018-05-08 RX ORDER — ONDANSETRON 2 MG/ML
4 INJECTION INTRAMUSCULAR; INTRAVENOUS EVERY 6 HOURS PRN
Status: DISCONTINUED | OUTPATIENT
Start: 2018-05-08 | End: 2018-05-09

## 2018-05-08 NOTE — ANESTHESIA PREPROCEDURE EVALUATION
PRE-OP EVALUATION    Patient Name: Juliana Rollins    Pre-op Diagnosis: right sided carotid artery disease    Procedure(s):  right carotid endarterectomy  Sravanthi JULIEN    Surgeon(s) and Role:     * Frieda Brown MD - Primary    Pre-op vitals reviewed. carcinoma in situ of breast Cataract          Past Surgical History:  No date: BREAST SURGERY  2018: CATARACT Right  No date: HYSTERECTOMY  No date: INTRAOCULAR LENS INSERTION  1990'S: DARREL BIOPSY STEREOTACTIC NODULE 1 SITE RIGHT      Comment: BENIGN  07/20

## 2018-05-08 NOTE — BRIEF OP NOTE
Patients Name: Ingrid Hancockberrydaniel  Attending Physician: Jamil Eugene MD  Operating Physician: Yudi Leija MD  CSN: [id number]     Location:  OR  MRN: [id number]    YOB: 1940  Admission Date: 5/8/2018  Operation Date: 5/8/2018    Brief

## 2018-05-08 NOTE — OPERATIVE REPORT
[address]    PATIENT'S NAME: [address] [place] [address] PHYSICIAN: Mary Hayward M.D. OPERATING PHYSICIAN: Mary Hayward M.D.    PATIENT ACCOUNT#:   [de-identified]    [facility]  MEDICAL RECORD #:   [id number]       DATE OF B and retracted laterally. The internal jugular and facial veins were identified, and the facial vein was ligated with a 2-0 silk suture and transected.   Then, the common carotid, internal carotid, and external carotid arteries were all exposed and dissecte irrigated. It was sewn in circumferentially using 6-0 Prolene suture starting at the apex in a circumferential fashion.   Prior to completion of the closure, the shunt was removed, and appropriate flushing maneuvers were performed, and the lumen was [place]

## 2018-05-08 NOTE — PAYOR COMM NOTE
--------------  Order Requisition   Admit to inpatient Once (Order #[id number]) on 5/8/18     ADMISSION REVIEW     Payor: [facility] Paul Mullins [place] #:  [id number]  Authorization Number: [id number]    Admit date: 5/8/18  Admit time: [address]  Date Action Dose Route User    5/8/2018 1047 Given 30200 Units Irrigation Duane Pereyra, MD      thrombin (THROMBIN-JMI) 5000 units powder     Date Action Dose Route User    5/8/2018 1046 Given 06344 Units Topical (Right Neck) Duane Pereyra, MD

## 2018-05-08 NOTE — ANESTHESIA POSTPROCEDURE EVALUATION
[facility] Patient Status:  Inpatient   Age/Gender 69year old female MRN [id number]   [facility] 6NE-A Attending Sumeet Hollingsworth MD   Hosp Day # 0 JOHNNA Martinez       Anesthesia Post-op Note    Procedure(s):

## 2018-05-08 NOTE — H&P
VASCULAR SURGERY HP NOTE           Name: Saul Contreras   : 1940  [id number]      REASON FOR VISIT:   Patient is a 69year old female who is here for treatment of  her severe R carotid artery disease. Continues to have no sx of stroke.  She did Oral Cap, Take 1 mg by mouth nightly.  , Disp: , Rfl:      EXAM:   EXAM:  /53 (BP Location: Right arm)   Pulse 66   Temp 98 °F (36.7 °C) (Oral)   Resp 15   Ht 5' 3\" (1.6 m)   Wt 125 lb (56.7 kg)   SpO2 99%   BMI 22.14 kg/m²   GENERAL: alert and orie

## 2018-05-09 VITALS
RESPIRATION RATE: 18 BRPM | HEART RATE: 62 BPM | HEIGHT: 63 IN | OXYGEN SATURATION: 98 % | SYSTOLIC BLOOD PRESSURE: 129 MMHG | TEMPERATURE: 99 F | DIASTOLIC BLOOD PRESSURE: 67 MMHG | WEIGHT: 129.88 LBS | BODY MASS INDEX: 23.01 KG/M2

## 2018-05-09 NOTE — PLAN OF CARE
Biotin (dietary supplement) discontinued per protocol and may be resumed upon discharge.      Preston Thorpe, PharmD  05/08/18  3:05 PM
HEMATOLOGIC - ADULT    • Maintains hematologic stability Completed    • Free from bleeding injury Completed        Patient/Family Goals    • Patient/Family Long Term Goal Completed    • Patient/Family Short Term Goal Completed        SKIN/TISSUE INTEGRITY
HEMATOLOGIC - ADULT    • Maintains hematologic stability Progressing    • Free from bleeding injury Progressing        Assumed care of pt at [address]. Pt A&O x4, very pleasant. R neck incision c/d/I, soft with no hematoma. VSS. NSR noted on the monitor.  Pt amb
Received patient from [place] [address] s/p [facility]. Patient is A/O x3. VSS. Right neck dressing CDI, no oozing. Patient denies any pain. Patient denies any trouble swallowing. Neuro intact. Family @ bedside. Will continue to monitor.  See computer charting for complete as
100.7

## 2018-05-21 PROBLEM — Z98.890 S/P CAROTID ENDARTERECTOMY: Status: ACTIVE | Noted: 2018-05-21

## 2018-08-02 ENCOUNTER — CHARTING TRANS (OUTPATIENT)
Dept: OTHER | Age: 78
End: 2018-08-02

## 2018-08-27 ENCOUNTER — HOSPITAL ENCOUNTER (OUTPATIENT)
Dept: MAMMOGRAPHY | Age: 78
Discharge: HOME | End: 2018-08-27
Attending: SURGERY
Payer: MEDICARE

## 2018-08-27 DIAGNOSIS — Z85.3 PERSONAL HISTORY OF MALIGNANT NEOPLASM OF BREAST: ICD-10-CM

## 2018-08-27 PROCEDURE — 77065 DX MAMMO INCL CAD UNI: CPT | Performed by: SURGERY

## 2018-08-27 PROCEDURE — 77061 BREAST TOMOSYNTHESIS UNI: CPT | Performed by: SURGERY

## 2018-10-31 VITALS
SYSTOLIC BLOOD PRESSURE: 128 MMHG | RESPIRATION RATE: 18 BRPM | TEMPERATURE: 98.1 F | DIASTOLIC BLOOD PRESSURE: 80 MMHG | HEART RATE: 72 BPM

## 2018-11-05 ENCOUNTER — LAB ENCOUNTER (OUTPATIENT)
Dept: LAB | Age: 78
End: 2018-11-05
Attending: FAMILY MEDICINE
Payer: MEDICARE

## 2018-11-05 DIAGNOSIS — E78.00 PURE HYPERCHOLESTEROLEMIA: Primary | ICD-10-CM

## 2018-11-05 DIAGNOSIS — I65.29 CAROTID ARTERY STENOSIS: ICD-10-CM

## 2018-11-05 PROCEDURE — 36415 COLL VENOUS BLD VENIPUNCTURE: CPT

## 2018-11-05 PROCEDURE — 80053 COMPREHEN METABOLIC PANEL: CPT

## 2018-11-05 PROCEDURE — 80061 LIPID PANEL: CPT

## 2019-02-28 VITALS
SYSTOLIC BLOOD PRESSURE: 148 MMHG | HEART RATE: 64 BPM | BODY MASS INDEX: 21.97 KG/M2 | DIASTOLIC BLOOD PRESSURE: 78 MMHG | HEIGHT: 63 IN | WEIGHT: 124 LBS

## 2019-02-28 VITALS
BODY MASS INDEX: 22.68 KG/M2 | SYSTOLIC BLOOD PRESSURE: 158 MMHG | HEIGHT: 63 IN | DIASTOLIC BLOOD PRESSURE: 78 MMHG | HEART RATE: 68 BPM | WEIGHT: 128 LBS

## 2019-02-28 VITALS
DIASTOLIC BLOOD PRESSURE: 74 MMHG | HEART RATE: 66 BPM | BODY MASS INDEX: 22.86 KG/M2 | SYSTOLIC BLOOD PRESSURE: 148 MMHG | HEIGHT: 63 IN | WEIGHT: 129 LBS

## 2019-03-01 VITALS
HEIGHT: 63 IN | BODY MASS INDEX: 21.97 KG/M2 | HEART RATE: 79 BPM | DIASTOLIC BLOOD PRESSURE: 60 MMHG | WEIGHT: 124 LBS | SYSTOLIC BLOOD PRESSURE: 120 MMHG

## 2019-03-12 RX ORDER — EZETIMIBE AND SIMVASTATIN 10; 40 MG/1; MG/1
10-40 TABLET ORAL DAILY
COMMUNITY
Start: 2018-03-14 | End: 2019-03-12 | Stop reason: SDUPTHER

## 2019-03-12 RX ORDER — EZETIMIBE AND SIMVASTATIN 10; 40 MG/1; MG/1
1 TABLET ORAL DAILY
Qty: 90 TABLET | Refills: 0 | Status: SHIPPED | OUTPATIENT
Start: 2019-03-12

## 2019-04-10 PROBLEM — M65.331 TRIGGER MIDDLE FINGER OF RIGHT HAND: Status: ACTIVE | Noted: 2019-04-10

## 2019-05-06 ENCOUNTER — LAB ENCOUNTER (OUTPATIENT)
Dept: LAB | Age: 79
End: 2019-05-06
Attending: FAMILY MEDICINE
Payer: MEDICARE

## 2019-05-06 DIAGNOSIS — E78.00 PURE HYPERCHOLESTEROLEMIA: Primary | ICD-10-CM

## 2019-05-06 PROCEDURE — 80061 LIPID PANEL: CPT

## 2019-05-06 PROCEDURE — 80053 COMPREHEN METABOLIC PANEL: CPT

## 2019-05-06 PROCEDURE — 36415 COLL VENOUS BLD VENIPUNCTURE: CPT

## 2019-05-15 ENCOUNTER — OFFICE VISIT (OUTPATIENT)
Dept: FAMILY MEDICINE CLINIC | Facility: CLINIC | Age: 79
End: 2019-05-15
Payer: COMMERCIAL

## 2019-05-15 VITALS
WEIGHT: 131 LBS | HEIGHT: 63 IN | OXYGEN SATURATION: 99 % | BODY MASS INDEX: 23.21 KG/M2 | RESPIRATION RATE: 20 BRPM | TEMPERATURE: 98 F | DIASTOLIC BLOOD PRESSURE: 63 MMHG | SYSTOLIC BLOOD PRESSURE: 136 MMHG | HEART RATE: 78 BPM

## 2019-05-15 DIAGNOSIS — H69.83 EUSTACHIAN TUBE DYSFUNCTION, BILATERAL: Primary | ICD-10-CM

## 2019-05-15 PROCEDURE — 99202 OFFICE O/P NEW SF 15 MIN: CPT | Performed by: NURSE PRACTITIONER

## 2019-05-15 NOTE — PROGRESS NOTES
CHIEF COMPLAINT:   Patient presents with:  Ear Wax: bilateral s/s for 2 weeks. no OTC meds taken      HPI:   Kevyn Gomes is a 67year old female who presents to clinic today with complaints of bilateral intermittent ear clogging.   Has had issues Smoking status: Former Smoker        Packs/day: 1.00        Years: 20.00        Pack years: 21        Quit date: 1980        Years since quittin.7      Smokeless tobacco: Never Used    Alcohol use: No    Drug use: No       REVIEW OF SYSTEMS: Patient voiced understand and is in agreement with treatment plan. Patient Instructions     Continue Claritin    Common Middle Ear Problems    Your middle ear may have been injured or infected recently.  Over time, certain growths or bone disease can als

## 2019-05-15 NOTE — PATIENT INSTRUCTIONS
Continue Claritin    Common Middle Ear Problems    Your middle ear may have been injured or infected recently. Over time, certain growths or bone disease can also harm the middle ear.  Left untreated, middle ear problems often lead to lifelong hearing los

## 2019-06-10 RX ORDER — EZETIMIBE AND SIMVASTATIN 10; 40 MG/1; MG/1
1 TABLET ORAL DAILY
Qty: 90 TABLET | Refills: 0 | OUTPATIENT
Start: 2019-06-10

## 2019-06-10 NOTE — CONSULTS
Cancer Center Report of Consultation    Patient Name: Saul Contreras   YOB: 1940   Medical Record Number: [id number]   CSN: [id number]   Consulting Physician: Torrie Gary MD  Referring Physician(s): No ref.  provider found  Date of Consu Father    • Hypertension Father        Gyne History:  OB History     T0    L0    SAB0  TAB0  Ectopic0  Multiple0  Live Births0     Psychosocial History:  Social History    Socioeconomic History      Marital status:        Spouse name: Not in the throat             Swelling in the throat  Penicillins             ANAPHYLAXIS    Comment:Swelling in the throat             Swelling in the throat    Current Medications:    Current Outpatient Medications:   •  Biotin 80791 MCG Oral Tab, Take 10,00 negatives were described in the HPI and above. All other systems were negative. Vital Signs: There were no vitals taken for this visit. Physical Examination:    Constitutional: Patient is alert and oriented x 3, not in acute distress.   HEENT:  Theotis Cole

## 2019-07-15 ENCOUNTER — APPOINTMENT (OUTPATIENT)
Dept: ULTRASOUND IMAGING | Age: 79
End: 2019-07-15
Attending: FAMILY MEDICINE
Payer: MEDICARE

## 2019-07-15 ENCOUNTER — HOSPITAL ENCOUNTER (OUTPATIENT)
Dept: MAMMOGRAPHY | Age: 79
Discharge: HOME | End: 2019-07-15
Attending: FAMILY MEDICINE
Payer: MEDICARE

## 2019-07-15 DIAGNOSIS — Z85.3 HISTORY OF RIGHT BREAST CANCER: ICD-10-CM

## 2019-07-15 PROCEDURE — 77061 BREAST TOMOSYNTHESIS UNI: CPT | Performed by: FAMILY MEDICINE

## 2019-07-15 PROCEDURE — 77065 DX MAMMO INCL CAD UNI: CPT | Performed by: FAMILY MEDICINE

## 2019-12-19 ENCOUNTER — HOSPITAL ENCOUNTER (OUTPATIENT)
Age: 79
Discharge: HOME | End: 2019-12-19
Attending: FAMILY MEDICINE
Payer: MEDICARE

## 2019-12-19 VITALS
HEART RATE: 73 BPM | SYSTOLIC BLOOD PRESSURE: 143 MMHG | BODY MASS INDEX: 23.39 KG/M2 | RESPIRATION RATE: 20 BRPM | OXYGEN SATURATION: 95 % | WEIGHT: 132 LBS | HEIGHT: 63 IN | TEMPERATURE: 98 F | DIASTOLIC BLOOD PRESSURE: 70 MMHG

## 2019-12-19 DIAGNOSIS — N39.0 UTI (URINARY TRACT INFECTION), UNCOMPLICATED: Primary | ICD-10-CM

## 2019-12-19 PROCEDURE — 99204 OFFICE O/P NEW MOD 45 MIN: CPT

## 2019-12-19 PROCEDURE — 87077 CULTURE AEROBIC IDENTIFY: CPT | Performed by: FAMILY MEDICINE

## 2019-12-19 PROCEDURE — 87086 URINE CULTURE/COLONY COUNT: CPT | Performed by: FAMILY MEDICINE

## 2019-12-19 PROCEDURE — 81002 URINALYSIS NONAUTO W/O SCOPE: CPT | Performed by: FAMILY MEDICINE

## 2019-12-19 PROCEDURE — 87186 SC STD MICRODIL/AGAR DIL: CPT | Performed by: FAMILY MEDICINE

## 2019-12-19 PROCEDURE — 99214 OFFICE O/P EST MOD 30 MIN: CPT

## 2019-12-19 RX ORDER — SULFAMETHOXAZOLE AND TRIMETHOPRIM 800; 160 MG/1; MG/1
1 TABLET ORAL 2 TIMES DAILY
Qty: 14 TABLET | Refills: 0 | Status: SHIPPED | OUTPATIENT
Start: 2019-12-19

## 2019-12-19 NOTE — ED INITIAL ASSESSMENT (HPI)
C/O loss of bladder last night when she had gotten up to go to the BR. Sts that after that she continued to feel like she had to urinate. Sts that it is also painful w/ urination. Has had some back pain, but feels that is chronic.

## 2019-12-19 NOTE — ED PROVIDER NOTES
Patient Seen in: THE [facility] END      History   Patient presents with:  Urinary Symptoms    Stated Complaint: BLADDER ISSUE X 1 DAY / LOST CONTROL OF BLADDER / BACK PAIN / LEGS TINGLE    HPI    This 73-year-old female presents to the quittin.3      Smokeless tobacco: Never Used    Alcohol use: No    Drug use:  No             Review of Systems    Positive for stated complaint: BLADDER ISSUE X 1 DAY / LOST CONTROL OF BLADDER / BACK PAIN / LEGS TINGLE  Other systems are as noted in HP to follow-up with her primary doctor in 2 to 3 days if not improving.         Disposition and Plan     Clinical Impression:  UTI (urinary tract infection), uncomplicated  (primary encounter diagnosis)    Disposition:  Discharge  12/19/2019  9:47 am    Freddie Judge

## 2019-12-22 NOTE — ED NOTES
Called the patient and was notified of urine culture. Patient was treated with Bactrim which is sensitive. Instructed to complete the course of antibiotic. Patient verbalized understanding.

## 2019-12-22 NOTE — ED NOTES
Final urine culture. Treatment appropriate with Bactrim which is sensitive. No further action required.

## 2020-01-29 ENCOUNTER — LAB ENCOUNTER (OUTPATIENT)
Dept: LAB | Age: 80
End: 2020-01-29
Attending: FAMILY MEDICINE
Payer: MEDICARE

## 2020-01-29 DIAGNOSIS — E78.00 PURE HYPERCHOLESTEROLEMIA: ICD-10-CM

## 2020-01-29 DIAGNOSIS — I10 ESSENTIAL HYPERTENSION, MALIGNANT: Primary | ICD-10-CM

## 2020-01-29 LAB
ALBUMIN SERPL-MCNC: 3.8 G/DL (ref 3.4–5)
ALBUMIN/GLOB SERPL: 1 {RATIO} (ref 1–2)
ALP LIVER SERPL-CCNC: 53 U/L (ref 55–142)
ALT SERPL-CCNC: 29 U/L (ref 13–56)
ANION GAP SERPL CALC-SCNC: 6 MMOL/L (ref 0–18)
AST SERPL-CCNC: 26 U/L (ref 15–37)
BASOPHILS # BLD AUTO: 0.06 X10(3) UL (ref 0–0.2)
BASOPHILS NFR BLD AUTO: 1.1 %
BILIRUB SERPL-MCNC: 0.5 MG/DL (ref 0.1–2)
BUN BLD-MCNC: 11 MG/DL (ref 7–18)
BUN/CREAT SERPL: 14.9 (ref 10–20)
CALCIUM BLD-MCNC: 8.8 MG/DL (ref 8.5–10.1)
CHLORIDE SERPL-SCNC: 106 MMOL/L (ref 98–112)
CHOLEST SMN-MCNC: 131 MG/DL (ref ?–200)
CO2 SERPL-SCNC: 28 MMOL/L (ref 21–32)
CREAT BLD-MCNC: 0.74 MG/DL (ref 0.55–1.02)
DEPRECATED RDW RBC AUTO: 42.9 FL (ref 35.1–46.3)
EOSINOPHIL # BLD AUTO: 0.32 X10(3) UL (ref 0–0.7)
EOSINOPHIL NFR BLD AUTO: 5.8 %
ERYTHROCYTE [DISTWIDTH] IN BLOOD BY AUTOMATED COUNT: 13 % (ref 11–15)
GLOBULIN PLAS-MCNC: 3.8 G/DL (ref 2.8–4.4)
GLUCOSE BLD-MCNC: 100 MG/DL (ref 70–99)
HCT VFR BLD AUTO: 40.3 % (ref 35–48)
HDLC SERPL-MCNC: 46 MG/DL (ref 40–59)
HGB BLD-MCNC: 12.6 G/DL (ref 12–16)
IMM GRANULOCYTES # BLD AUTO: 0.01 X10(3) UL (ref 0–1)
IMM GRANULOCYTES NFR BLD: 0.2 %
LDLC SERPL CALC-MCNC: 59 MG/DL (ref ?–100)
LYMPHOCYTES # BLD AUTO: 1.92 X10(3) UL (ref 1–4)
LYMPHOCYTES NFR BLD AUTO: 35 %
M PROTEIN MFR SERPL ELPH: 7.6 G/DL (ref 6.4–8.2)
MCH RBC QN AUTO: 28 PG (ref 26–34)
MCHC RBC AUTO-ENTMCNC: 31.3 G/DL (ref 31–37)
MCV RBC AUTO: 89.6 FL (ref 80–100)
MONOCYTES # BLD AUTO: 0.56 X10(3) UL (ref 0.1–1)
MONOCYTES NFR BLD AUTO: 10.2 %
NEUTROPHILS # BLD AUTO: 2.62 X10 (3) UL (ref 1.5–7.7)
NEUTROPHILS # BLD AUTO: 2.62 X10(3) UL (ref 1.5–7.7)
NEUTROPHILS NFR BLD AUTO: 47.7 %
NONHDLC SERPL-MCNC: 85 MG/DL (ref ?–130)
OSMOLALITY SERPL CALC.SUM OF ELEC: 289 MOSM/KG (ref 275–295)
PATIENT FASTING Y/N/NP: YES
PATIENT FASTING Y/N/NP: YES
PLATELET # BLD AUTO: 247 10(3)UL (ref 150–450)
POTASSIUM SERPL-SCNC: 3.7 MMOL/L (ref 3.5–5.1)
RBC # BLD AUTO: 4.5 X10(6)UL (ref 3.8–5.3)
SODIUM SERPL-SCNC: 140 MMOL/L (ref 136–145)
TRIGL SERPL-MCNC: 129 MG/DL (ref 30–149)
VLDLC SERPL CALC-MCNC: 26 MG/DL (ref 0–30)
WBC # BLD AUTO: 5.5 X10(3) UL (ref 4–11)

## 2020-01-29 PROCEDURE — 36415 COLL VENOUS BLD VENIPUNCTURE: CPT

## 2020-01-29 PROCEDURE — 80053 COMPREHEN METABOLIC PANEL: CPT

## 2020-01-29 PROCEDURE — 80061 LIPID PANEL: CPT

## 2020-01-29 PROCEDURE — 85025 COMPLETE CBC W/AUTO DIFF WBC: CPT

## 2020-02-24 ENCOUNTER — HOSPITAL ENCOUNTER (OUTPATIENT)
Dept: BONE DENSITY | Age: 80
Discharge: HOME | End: 2020-02-24
Attending: FAMILY MEDICINE
Payer: MEDICARE

## 2020-02-24 DIAGNOSIS — M81.0 OSTEOPOROSIS: ICD-10-CM

## 2020-02-24 PROCEDURE — 77080 DXA BONE DENSITY AXIAL: CPT | Performed by: FAMILY MEDICINE

## 2020-03-09 ENCOUNTER — HOSPITAL ENCOUNTER (OUTPATIENT)
Dept: MAMMOGRAPHY | Age: 80
Discharge: HOME | End: 2020-03-09
Attending: FAMILY MEDICINE
Payer: MEDICARE

## 2020-03-09 DIAGNOSIS — Z12.31 ENCOUNTER FOR SCREENING MAMMOGRAM FOR MALIGNANT NEOPLASM OF BREAST: ICD-10-CM

## 2020-03-09 PROCEDURE — 77067 SCR MAMMO BI INCL CAD: CPT | Performed by: FAMILY MEDICINE

## 2020-03-09 PROCEDURE — 77063 BREAST TOMOSYNTHESIS BI: CPT | Performed by: FAMILY MEDICINE

## 2020-03-12 ENCOUNTER — HOSPITAL ENCOUNTER (OUTPATIENT)
Dept: ULTRASOUND IMAGING | Facility: HOSPITAL | Age: 80
Discharge: HOME | End: 2020-03-12
Attending: FAMILY MEDICINE
Payer: MEDICARE

## 2020-03-12 DIAGNOSIS — I65.29 CAROTID STENOSIS: ICD-10-CM

## 2020-03-12 PROCEDURE — 93880 EXTRACRANIAL BILAT STUDY: CPT | Performed by: FAMILY MEDICINE

## 2020-05-05 PROBLEM — M65.351 TRIGGER LITTLE FINGER OF RIGHT HAND: Status: ACTIVE | Noted: 2020-05-05

## 2020-10-05 ENCOUNTER — HOSPITAL ENCOUNTER (EMERGENCY)
Facility: HOSPITAL | Age: 80
Discharge: HOME | End: 2020-10-05
Attending: EMERGENCY MEDICINE
Payer: MEDICARE

## 2020-10-05 ENCOUNTER — APPOINTMENT (OUTPATIENT)
Dept: CT IMAGING | Facility: HOSPITAL | Age: 80
End: 2020-10-05
Attending: EMERGENCY MEDICINE
Payer: MEDICARE

## 2020-10-05 VITALS
DIASTOLIC BLOOD PRESSURE: 66 MMHG | TEMPERATURE: 98 F | SYSTOLIC BLOOD PRESSURE: 148 MMHG | RESPIRATION RATE: 18 BRPM | WEIGHT: 132.06 LBS | OXYGEN SATURATION: 98 % | HEART RATE: 63 BPM | HEIGHT: 62.99 IN | BODY MASS INDEX: 23.4 KG/M2

## 2020-10-05 DIAGNOSIS — R51.9 NONINTRACTABLE HEADACHE, UNSPECIFIED CHRONICITY PATTERN, UNSPECIFIED HEADACHE TYPE: Primary | ICD-10-CM

## 2020-10-05 PROCEDURE — 99284 EMERGENCY DEPT VISIT MOD MDM: CPT

## 2020-10-05 PROCEDURE — 70450 CT HEAD/BRAIN W/O DYE: CPT | Performed by: EMERGENCY MEDICINE

## 2020-10-05 PROCEDURE — 85652 RBC SED RATE AUTOMATED: CPT | Performed by: EMERGENCY MEDICINE

## 2020-10-05 PROCEDURE — 80048 BASIC METABOLIC PNL TOTAL CA: CPT | Performed by: EMERGENCY MEDICINE

## 2020-10-05 PROCEDURE — 36415 COLL VENOUS BLD VENIPUNCTURE: CPT

## 2020-10-05 PROCEDURE — 85025 COMPLETE CBC W/AUTO DIFF WBC: CPT | Performed by: EMERGENCY MEDICINE

## 2020-10-05 RX ORDER — ACETAMINOPHEN 500 MG
1000 TABLET ORAL ONCE
Status: COMPLETED | OUTPATIENT
Start: 2020-10-05 | End: 2020-10-05

## 2020-10-05 NOTE — ED PROVIDER NOTES
Patient Seen in: [facility] Emergency Department      History   Patient presents with:  Headache    Stated Complaint: sudden sharp pains to left side of head    HPI    This is a 78-year-old female who presents with left temporal pain.   She denies an left side of head  Other systems are as noted in HPI. Constitutional and vital signs reviewed. All other systems reviewed and negative except as noted above.     Physical Exam     ED Triage Vitals [10/05/20 1053]   /74   Pulse 71   Resp 20   Tem -----------         ------                     CBC W/ DIFFERENTIAL[[id number]]                              Final result                 Please view results for these tests on the individual orders.    RAINBOW DRAW BLUE   RAINBOW DRAW LAVENDER SKULL:             No evidence for fracture or osseous abnormality. OTHER:             Atherosclerosis. CONCLUSION:  1. No acute intracranial hemorrhage.   2. Findings most consistent with mild chronic small vessel ischemic change within the temo

## 2020-10-05 NOTE — ED INITIAL ASSESSMENT (HPI)
Sharp pain to left side of head starting this am.  Right sided carotid artery surgery 2 years ago. Left carotid is 60% blocked.

## 2021-02-06 DIAGNOSIS — Z23 NEED FOR VACCINATION: ICD-10-CM

## 2021-09-15 ENCOUNTER — OFFICE VISIT (OUTPATIENT)
Dept: SURGERY | Facility: CLINIC | Age: 81
End: 2021-09-15
Payer: COMMERCIAL

## 2021-09-15 DIAGNOSIS — R39.15 URINARY URGENCY: ICD-10-CM

## 2021-09-15 DIAGNOSIS — R31.29 MICROSCOPIC HEMATURIA: Primary | ICD-10-CM

## 2021-09-15 LAB
BILIRUB UR QL: NEGATIVE
CLARITY UR: CLEAR
COLOR UR: YELLOW
GLUCOSE UR-MCNC: NEGATIVE MG/DL
KETONES UR-MCNC: NEGATIVE MG/DL
LEUKOCYTE ESTERASE UR QL STRIP.AUTO: NEGATIVE
NITRITE UR QL STRIP.AUTO: NEGATIVE
PH UR: 6 [PH] (ref 5–8)
PROT UR-MCNC: NEGATIVE MG/DL
SP GR UR STRIP: 1.01 (ref 1–1.03)
UROBILINOGEN UR STRIP-ACNC: <2

## 2021-09-15 PROCEDURE — 99203 OFFICE O/P NEW LOW 30 MIN: CPT | Performed by: PHYSICIAN ASSISTANT

## 2021-09-15 NOTE — PATIENT INSTRUCTIONS
Good Hygiene: Always wipe front to back. Don’t use perfumed soaps. Plain soaps like [place] are the best.  Don’t use washcloths. Place soap directly on your hands and clean the genital area. Rinse thoroughly.   Soap can be very irritating to the vaginal m be added on a daily or as needed basis if necessary (Miralax, Colace, Pericolace). Vaginal creams and moisturizers: It may be recommended you try vaginal creams, moisturizers or lubricants as a prevention method.   Over-the-Counter products:  Replens: for 3 days. · Bladder ultrasound. This studies the bladder as it empties. Ultrasound uses sound waves to make detailed images of the inside of the body. · Cystoscopy. This test lets the provider look for problems in the urinary tract.  The test uses a [place] severe cases. With treatment, OAB can be managed. A condition, such as UTI, that has led to your OAB will be treated. Treatment may include taking medicine for months or years. You may also need to make changes in your daily routine.  This may include italo

## 2021-09-15 NOTE — PROGRESS NOTES
[address] [place], [address] [facility]    Urology Consult Note    History of Present Illness:   Patient is a [de-identified]year old female with HTN, breast CA, cartoid stenosis s/p CEA, lewey body dementia presents today from Dr. So Desouza office for evaluation o HYSTERECTOMY     • INTRAOCULAR LENS INSERTION     • DARREL BIOPSY STEREO NODULE 1 SITE RIGHT (CPT=19081)  1990'S    BENIGN   • DARREL BIOPSY STEREO W/CALC 3 SITE RIGHT (CPT=19081/46309(X2))  07/2017    DCIS   • MASTECTOMY RIGHT  08/25/2017    DCIS   • SIGMOIDOSC dion MONTEMAYOR today +small blood.     Results:     Laboratory Data:  Lab Results   Component Value Date    WBC 7.9 10/05/2020    HGB 12.7 10/05/2020    .0 10/05/2020     Lab Results   Component Value Date     10/05/2020    K 3.9 10/05/2020    CL

## 2021-10-16 ENCOUNTER — LAB ENCOUNTER (OUTPATIENT)
Dept: LAB | Age: 81
End: 2021-10-16
Attending: INTERNAL MEDICINE
Payer: MEDICARE

## 2021-10-16 DIAGNOSIS — Z01.818 PRE-OP TESTING: ICD-10-CM

## 2021-10-19 PROBLEM — K21.9 GERD (GASTROESOPHAGEAL REFLUX DISEASE): Status: ACTIVE | Noted: 2021-10-19

## 2021-10-19 PROBLEM — R12 CHRONIC HEARTBURN: Status: ACTIVE | Noted: 2021-10-19

## 2021-11-22 ENCOUNTER — HOSPITAL ENCOUNTER (OUTPATIENT)
Dept: MAMMOGRAPHY | Age: 81
Discharge: HOME | End: 2021-11-22
Attending: FAMILY MEDICINE
Payer: MEDICARE

## 2021-11-22 DIAGNOSIS — Z12.31 ENCOUNTER FOR SCREENING MAMMOGRAM FOR MALIGNANT NEOPLASM OF BREAST: ICD-10-CM

## 2021-11-22 PROCEDURE — 77067 SCR MAMMO BI INCL CAD: CPT | Performed by: FAMILY MEDICINE

## 2021-11-22 PROCEDURE — 77063 BREAST TOMOSYNTHESIS BI: CPT | Performed by: FAMILY MEDICINE

## 2021-12-12 ENCOUNTER — HOSPITAL ENCOUNTER (OUTPATIENT)
Age: 81
Discharge: HOME | End: 2021-12-12
Attending: EMERGENCY MEDICINE
Payer: COMMERCIAL

## 2021-12-12 ENCOUNTER — APPOINTMENT (OUTPATIENT)
Dept: GENERAL RADIOLOGY | Age: 81
End: 2021-12-12
Attending: EMERGENCY MEDICINE
Payer: COMMERCIAL

## 2021-12-12 VITALS
DIASTOLIC BLOOD PRESSURE: 72 MMHG | HEART RATE: 62 BPM | HEIGHT: 63 IN | TEMPERATURE: 97 F | BODY MASS INDEX: 23.57 KG/M2 | OXYGEN SATURATION: 97 % | WEIGHT: 133 LBS | RESPIRATION RATE: 18 BRPM | SYSTOLIC BLOOD PRESSURE: 145 MMHG

## 2021-12-12 DIAGNOSIS — S60.221A CONTUSION OF RIGHT HAND, INITIAL ENCOUNTER: Primary | ICD-10-CM

## 2021-12-12 PROCEDURE — 73130 X-RAY EXAM OF HAND: CPT | Performed by: EMERGENCY MEDICINE

## 2021-12-12 PROCEDURE — 73110 X-RAY EXAM OF WRIST: CPT | Performed by: EMERGENCY MEDICINE

## 2021-12-12 PROCEDURE — 99213 OFFICE O/P EST LOW 20 MIN: CPT

## 2021-12-12 RX ORDER — OMEPRAZOLE 40 MG/1
CAPSULE, DELAYED RELEASE ORAL
COMMUNITY
Start: 2021-11-15 | End: 2021-12-13

## 2021-12-12 NOTE — ED PROVIDER NOTES
Over  Patient Seen in: [facility] [place]      History   Patient presents with:  Wrist Pain  Fall    Stated Complaint: right wrist injury / fell at grocery store    Subjective:   HPI    73-year-old woman who was walking into the grocery store and s complaint: right wrist injury / fell at grocery store  Other systems are as noted in HPI. Constitutional and vital signs reviewed. All other systems reviewed and negative except as noted above.     Physical Exam     ED Triage Vitals [12/12/21 1217] There is no fracture detected.    Dictated by (CST): Dinorah Membreno MD on 12/12/2021 at 1:24 PM     Finalized by (CST): Dinorah Membreno MD on 12/12/2021 at 1:26 PM       XR WRIST COMPLETE (MIN 3 VIEWS), RIGHT (CPT=73110)    Result Date: 12/12/2021  PROCEDURE:

## 2021-12-12 NOTE — ED INITIAL ASSESSMENT (HPI)
Patient reports today fell at the grocery store parking lot. Slipped on black ice. Landed with the right wrist. With bruising and swelling.

## 2022-05-26 ENCOUNTER — OFFICE VISIT (OUTPATIENT)
Dept: INTERNAL MEDICINE CLINIC | Facility: CLINIC | Age: 82
End: 2022-05-26
Payer: COMMERCIAL

## 2022-05-26 ENCOUNTER — LAB ENCOUNTER (OUTPATIENT)
Dept: LAB | Age: 82
End: 2022-05-26
Attending: INTERNAL MEDICINE
Payer: MEDICARE

## 2022-05-26 VITALS
HEART RATE: 55 BPM | DIASTOLIC BLOOD PRESSURE: 60 MMHG | TEMPERATURE: 98 F | SYSTOLIC BLOOD PRESSURE: 130 MMHG | WEIGHT: 138.63 LBS | BODY MASS INDEX: 24.56 KG/M2 | HEIGHT: 63.03 IN | OXYGEN SATURATION: 98 %

## 2022-05-26 DIAGNOSIS — G31.83 LEWY BODY DEMENTIA WITHOUT BEHAVIORAL DISTURBANCE (HCC): Chronic | ICD-10-CM

## 2022-05-26 DIAGNOSIS — Z12.31 ENCOUNTER FOR SCREENING MAMMOGRAM FOR MALIGNANT NEOPLASM OF BREAST: ICD-10-CM

## 2022-05-26 DIAGNOSIS — K21.9 GASTROESOPHAGEAL REFLUX DISEASE, UNSPECIFIED WHETHER ESOPHAGITIS PRESENT: Chronic | ICD-10-CM

## 2022-05-26 DIAGNOSIS — F02.80 LEWY BODY DEMENTIA WITHOUT BEHAVIORAL DISTURBANCE (HCC): Chronic | ICD-10-CM

## 2022-05-26 DIAGNOSIS — B00.1 RECURRENT COLD SORES: Chronic | ICD-10-CM

## 2022-05-26 DIAGNOSIS — E78.2 MIXED HYPERLIPIDEMIA: Chronic | ICD-10-CM

## 2022-05-26 DIAGNOSIS — M81.0 AGE-RELATED OSTEOPOROSIS WITHOUT CURRENT PATHOLOGICAL FRACTURE: Chronic | ICD-10-CM

## 2022-05-26 DIAGNOSIS — I77.9 RIGHT-SIDED CAROTID ARTERY DISEASE, UNSPECIFIED TYPE (HCC): Chronic | ICD-10-CM

## 2022-05-26 DIAGNOSIS — R42 VERTIGO: Chronic | ICD-10-CM

## 2022-05-26 DIAGNOSIS — I10 ESSENTIAL HYPERTENSION: Primary | Chronic | ICD-10-CM

## 2022-05-26 PROBLEM — Z98.890 S/P CAROTID ENDARTERECTOMY: Status: RESOLVED | Noted: 2018-05-21 | Resolved: 2022-05-26

## 2022-05-26 PROBLEM — Z90.11 S/P RIGHT MASTECTOMY: Status: RESOLVED | Noted: 2017-09-14 | Resolved: 2022-05-26

## 2022-05-26 PROBLEM — M65.351 TRIGGER LITTLE FINGER OF RIGHT HAND: Status: RESOLVED | Noted: 2020-05-05 | Resolved: 2022-05-26

## 2022-05-26 PROBLEM — M65.331 TRIGGER MIDDLE FINGER OF RIGHT HAND: Status: RESOLVED | Noted: 2019-04-10 | Resolved: 2022-05-26

## 2022-05-26 PROBLEM — D05.11 DUCTAL CARCINOMA IN SITU (DCIS) OF RIGHT BREAST: Status: RESOLVED | Noted: 2017-08-25 | Resolved: 2022-05-26

## 2022-05-26 PROBLEM — Z85.3 HISTORY OF CANCER OF RIGHT BREAST: Status: RESOLVED | Noted: 2017-09-14 | Resolved: 2022-05-26

## 2022-05-26 LAB
CHOLEST SERPL-MCNC: 139 MG/DL (ref ?–200)
FASTING PATIENT LIPID ANSWER: YES
HDLC SERPL-MCNC: 52 MG/DL (ref 40–59)
LDLC SERPL CALC-MCNC: 64 MG/DL (ref ?–100)
NONHDLC SERPL-MCNC: 87 MG/DL (ref ?–130)
TRIGL SERPL-MCNC: 133 MG/DL (ref 30–149)
VIT D+METAB SERPL-MCNC: 50.1 NG/ML (ref 30–100)
VLDLC SERPL CALC-MCNC: 20 MG/DL (ref 0–30)

## 2022-05-26 PROCEDURE — 80061 LIPID PANEL: CPT

## 2022-05-26 PROCEDURE — 3075F SYST BP GE 130 - 139MM HG: CPT | Performed by: INTERNAL MEDICINE

## 2022-05-26 PROCEDURE — 99204 OFFICE O/P NEW MOD 45 MIN: CPT | Performed by: INTERNAL MEDICINE

## 2022-05-26 PROCEDURE — 3078F DIAST BP <80 MM HG: CPT | Performed by: INTERNAL MEDICINE

## 2022-05-26 PROCEDURE — 3008F BODY MASS INDEX DOCD: CPT | Performed by: INTERNAL MEDICINE

## 2022-05-26 PROCEDURE — 36415 COLL VENOUS BLD VENIPUNCTURE: CPT

## 2022-05-26 PROCEDURE — 82306 VITAMIN D 25 HYDROXY: CPT

## 2022-05-26 RX ORDER — AMLODIPINE BESYLATE 5 MG/1
5 TABLET ORAL DAILY
Qty: 90 TABLET | Refills: 3 | Status: SHIPPED | OUTPATIENT
Start: 2022-05-26

## 2022-05-26 RX ORDER — OMEPRAZOLE 40 MG/1
40 CAPSULE, DELAYED RELEASE ORAL DAILY
Qty: 90 CAPSULE | Refills: 3 | Status: SHIPPED | OUTPATIENT
Start: 2022-05-26

## 2022-05-26 RX ORDER — EZETIMIBE AND SIMVASTATIN 10; 40 MG/1; MG/1
1 TABLET ORAL NIGHTLY
Qty: 90 TABLET | Refills: 3 | Status: SHIPPED | OUTPATIENT
Start: 2022-05-26

## 2022-05-26 RX ORDER — MEMANTINE HYDROCHLORIDE 10 MG/1
10 TABLET ORAL 2 TIMES DAILY
COMMUNITY
Start: 2022-05-15

## 2022-05-26 NOTE — PATIENT INSTRUCTIONS
- Continue current medications  - Omeprazole, amlodipine, ezetimibe-simvastatin prescriptions sent to pharmacy  - Let us know when you need other refills  - DEXA scan order entered. Can schedule through [address] [place] [address] [address], [employer], or call [phone]  - You can wait until November to schedule mammogram.  Order entered. - I would recommend getting your next COVID booster shot some time towards the end of summer.  - We will check cholesterol and vitamin D blood tests today  - Follow up with me in 6 months for chronic issues/Medicare Wellness Visit; follow up earlier as needed. It was a pleasure seeing you in the clinic today. Thank you for choosing the [facility] office for your healthcare needs. Please call at [phone] with any questions or concerns.     Kavya George MD

## 2022-06-10 ENCOUNTER — PATIENT MESSAGE (OUTPATIENT)
Dept: INTERNAL MEDICINE CLINIC | Facility: CLINIC | Age: 82
End: 2022-06-10

## 2022-06-10 DIAGNOSIS — I10 ESSENTIAL HYPERTENSION: Chronic | ICD-10-CM

## 2022-06-11 ENCOUNTER — HOSPITAL ENCOUNTER (OUTPATIENT)
Dept: BONE DENSITY | Age: 82
Discharge: HOME | End: 2022-06-11
Attending: INTERNAL MEDICINE
Payer: MEDICARE

## 2022-06-11 DIAGNOSIS — M81.0 AGE-RELATED OSTEOPOROSIS WITHOUT CURRENT PATHOLOGICAL FRACTURE: Chronic | ICD-10-CM

## 2022-06-11 PROCEDURE — 77080 DXA BONE DENSITY AXIAL: CPT | Performed by: INTERNAL MEDICINE

## 2022-06-12 DIAGNOSIS — M81.0 AGE-RELATED OSTEOPOROSIS WITHOUT CURRENT PATHOLOGICAL FRACTURE: Primary | Chronic | ICD-10-CM

## 2022-06-12 RX ORDER — ALENDRONATE SODIUM 70 MG/1
70 TABLET ORAL
Qty: 12 TABLET | Refills: 1 | Status: SHIPPED | OUTPATIENT
Start: 2022-06-12 | End: 2022-06-13

## 2022-08-18 ENCOUNTER — TELEPHONE (OUTPATIENT)
Dept: INTERNAL MEDICINE CLINIC | Facility: CLINIC | Age: 82
End: 2022-08-18

## 2022-08-18 RX ORDER — QUETIAPINE FUMARATE 25 MG/1
TABLET, FILM COATED ORAL
COMMUNITY
Start: 2022-07-19

## 2022-08-18 NOTE — TELEPHONE ENCOUNTER
HORACE HULL    Received clinical update as below from the pt:    SEROquel (QUEtiapine) 25 MG Tabs 7/19/2022 Sabra Code Schwass Prescribed by Dr. Arlene Sewell for returned hallucinations     Added to current medication list.

## 2022-09-01 ENCOUNTER — EKG ENCOUNTER (OUTPATIENT)
Dept: LAB | Age: 82
End: 2022-09-01
Attending: CLINICAL NURSE SPECIALIST
Payer: MEDICARE

## 2022-09-01 DIAGNOSIS — Z51.81 MEDICATION MONITORING ENCOUNTER: ICD-10-CM

## 2022-09-01 DIAGNOSIS — R44.3 HALLUCINATIONS: Primary | ICD-10-CM

## 2022-09-01 LAB
ATRIAL RATE: 62 BPM
P AXIS: 61 DEGREES
P-R INTERVAL: 188 MS
Q-T INTERVAL: 436 MS
QRS DURATION: 86 MS
QTC CALCULATION (BEZET): 442 MS
R AXIS: -26 DEGREES
T AXIS: 73 DEGREES
VENTRICULAR RATE: 62 BPM

## 2022-09-01 PROCEDURE — 93005 ELECTROCARDIOGRAM TRACING: CPT

## 2022-09-01 PROCEDURE — 93010 ELECTROCARDIOGRAM REPORT: CPT | Performed by: INTERNAL MEDICINE

## 2022-10-21 DIAGNOSIS — E78.2 MIXED HYPERLIPIDEMIA: Chronic | ICD-10-CM

## 2022-10-21 DIAGNOSIS — I10 ESSENTIAL HYPERTENSION: Chronic | ICD-10-CM

## 2022-10-21 DIAGNOSIS — K21.9 GASTROESOPHAGEAL REFLUX DISEASE, UNSPECIFIED WHETHER ESOPHAGITIS PRESENT: Chronic | ICD-10-CM

## 2022-10-21 DIAGNOSIS — I77.9 RIGHT-SIDED CAROTID ARTERY DISEASE, UNSPECIFIED TYPE (HCC): Chronic | ICD-10-CM

## 2022-10-22 RX ORDER — AMLODIPINE BESYLATE 5 MG/1
5 TABLET ORAL DAILY
Qty: 90 TABLET | Refills: 3 | Status: SHIPPED | OUTPATIENT
Start: 2022-10-22

## 2022-10-22 RX ORDER — OMEPRAZOLE 40 MG/1
40 CAPSULE, DELAYED RELEASE ORAL DAILY
Qty: 90 CAPSULE | Refills: 3 | Status: SHIPPED | OUTPATIENT
Start: 2022-10-22

## 2022-10-22 RX ORDER — EZETIMIBE AND SIMVASTATIN 10; 40 MG/1; MG/1
1 TABLET ORAL NIGHTLY
Qty: 90 TABLET | Refills: 3 | Status: SHIPPED | OUTPATIENT
Start: 2022-10-22

## 2022-10-31 ENCOUNTER — TELEPHONE (OUTPATIENT)
Dept: INTERNAL MEDICINE CLINIC | Facility: CLINIC | Age: 82
End: 2022-10-31

## 2022-10-31 ENCOUNTER — OFFICE VISIT (OUTPATIENT)
Dept: INTERNAL MEDICINE CLINIC | Facility: CLINIC | Age: 82
End: 2022-10-31
Payer: COMMERCIAL

## 2022-10-31 VITALS
OXYGEN SATURATION: 99 % | HEART RATE: 78 BPM | RESPIRATION RATE: 16 BRPM | BODY MASS INDEX: 25.62 KG/M2 | TEMPERATURE: 98 F | DIASTOLIC BLOOD PRESSURE: 60 MMHG | SYSTOLIC BLOOD PRESSURE: 130 MMHG | HEIGHT: 63 IN | WEIGHT: 144.63 LBS

## 2022-10-31 DIAGNOSIS — M81.0 AGE-RELATED OSTEOPOROSIS WITHOUT CURRENT PATHOLOGICAL FRACTURE: Chronic | ICD-10-CM

## 2022-10-31 DIAGNOSIS — M79.641 BILATERAL HAND PAIN: ICD-10-CM

## 2022-10-31 DIAGNOSIS — G31.83 LEWY BODY DEMENTIA WITHOUT BEHAVIORAL DISTURBANCE (HCC): Chronic | ICD-10-CM

## 2022-10-31 DIAGNOSIS — Z00.00 PREVENTATIVE HEALTH CARE: Primary | ICD-10-CM

## 2022-10-31 DIAGNOSIS — I77.9 RIGHT-SIDED CAROTID ARTERY DISEASE, UNSPECIFIED TYPE (HCC): Chronic | ICD-10-CM

## 2022-10-31 DIAGNOSIS — E78.2 MIXED HYPERLIPIDEMIA: Chronic | ICD-10-CM

## 2022-10-31 DIAGNOSIS — M79.605 BILATERAL LEG PAIN: ICD-10-CM

## 2022-10-31 DIAGNOSIS — I10 PRIMARY HYPERTENSION: ICD-10-CM

## 2022-10-31 DIAGNOSIS — F02.80 LEWY BODY DEMENTIA WITHOUT BEHAVIORAL DISTURBANCE (HCC): Chronic | ICD-10-CM

## 2022-10-31 DIAGNOSIS — K21.9 GASTROESOPHAGEAL REFLUX DISEASE, UNSPECIFIED WHETHER ESOPHAGITIS PRESENT: Chronic | ICD-10-CM

## 2022-10-31 DIAGNOSIS — B00.1 RECURRENT COLD SORES: Chronic | ICD-10-CM

## 2022-10-31 DIAGNOSIS — R42 VERTIGO: Chronic | ICD-10-CM

## 2022-10-31 DIAGNOSIS — M79.604 BILATERAL LEG PAIN: ICD-10-CM

## 2022-10-31 DIAGNOSIS — M79.642 BILATERAL HAND PAIN: ICD-10-CM

## 2022-10-31 PROBLEM — R44.3 HALLUCINATIONS: Status: RESOLVED | Noted: 2022-05-09 | Resolved: 2022-10-31

## 2022-10-31 PROBLEM — R44.3 HALLUCINATIONS: Status: ACTIVE | Noted: 2022-05-09

## 2022-10-31 RX ORDER — QUETIAPINE FUMARATE 50 MG/1
TABLET, FILM COATED ORAL
COMMUNITY
Start: 2022-10-29

## 2022-10-31 RX ORDER — QUETIAPINE FUMARATE 25 MG/1
25 TABLET, FILM COATED ORAL NIGHTLY
COMMUNITY

## 2022-10-31 NOTE — TELEPHONE ENCOUNTER
Pt filled out parking placard form in office. Pt had appt on 10/31    Gave signed form to [address] [place] [address]    Pt requesting [facility] to be sent once ready for .

## 2022-10-31 NOTE — PATIENT INSTRUCTIONS
- Repeat blood pressure reading was normal  - Get blood tests done to look into leg and hand pain. Do not have to fast.    - Will continue current medications  - Follow up in 6-12 months for chronic issues/Medicare Wellness visit; follow up earlier as needed. It was a pleasure seeing you in the clinic today. Thank you for choosing the [facility] office for your healthcare needs. Please call at [phone] with any questions or concerns.     Lincoln Santiago MD

## 2022-11-30 ENCOUNTER — HOSPITAL ENCOUNTER (OUTPATIENT)
Dept: MAMMOGRAPHY | Age: 82
Discharge: HOME | End: 2022-11-30
Attending: INTERNAL MEDICINE
Payer: MEDICARE

## 2022-11-30 ENCOUNTER — LAB ENCOUNTER (OUTPATIENT)
Dept: LAB | Age: 82
End: 2022-11-30
Attending: INTERNAL MEDICINE
Payer: MEDICARE

## 2022-11-30 DIAGNOSIS — M79.641 BILATERAL HAND PAIN: ICD-10-CM

## 2022-11-30 DIAGNOSIS — Z12.31 ENCOUNTER FOR SCREENING MAMMOGRAM FOR MALIGNANT NEOPLASM OF BREAST: ICD-10-CM

## 2022-11-30 DIAGNOSIS — M79.605 BILATERAL LEG PAIN: ICD-10-CM

## 2022-11-30 DIAGNOSIS — I10 PRIMARY HYPERTENSION: ICD-10-CM

## 2022-11-30 DIAGNOSIS — M79.642 BILATERAL HAND PAIN: ICD-10-CM

## 2022-11-30 DIAGNOSIS — M79.604 BILATERAL LEG PAIN: ICD-10-CM

## 2022-11-30 PROBLEM — D64.9 NORMOCYTIC ANEMIA: Status: ACTIVE | Noted: 2022-11-30

## 2022-11-30 LAB
ALBUMIN SERPL-MCNC: 3.9 G/DL (ref 3.4–5)
ALBUMIN/GLOB SERPL: 1.2 {RATIO} (ref 1–2)
ALP LIVER SERPL-CCNC: 82 U/L
ALT SERPL-CCNC: 31 U/L
ANION GAP SERPL CALC-SCNC: 4 MMOL/L (ref 0–18)
AST SERPL-CCNC: 25 U/L (ref 15–37)
BASOPHILS # BLD AUTO: 0.05 X10(3) UL (ref 0–0.2)
BASOPHILS NFR BLD AUTO: 1 %
BILIRUB SERPL-MCNC: 0.3 MG/DL (ref 0.1–2)
BUN BLD-MCNC: 14 MG/DL (ref 7–18)
CALCIUM BLD-MCNC: 9.4 MG/DL (ref 8.5–10.1)
CHLORIDE SERPL-SCNC: 108 MMOL/L (ref 98–112)
CO2 SERPL-SCNC: 30 MMOL/L (ref 21–32)
CREAT BLD-MCNC: 0.91 MG/DL
EOSINOPHIL # BLD AUTO: 0.5 X10(3) UL (ref 0–0.7)
EOSINOPHIL NFR BLD AUTO: 9.8 %
ERYTHROCYTE [DISTWIDTH] IN BLOOD BY AUTOMATED COUNT: 14.8 %
FASTING STATUS PATIENT QL REPORTED: NO
GFR SERPLBLD BASED ON 1.73 SQ M-ARVRAT: 63 ML/MIN/1.73M2 (ref 60–?)
GLOBULIN PLAS-MCNC: 3.3 G/DL (ref 2.8–4.4)
GLUCOSE BLD-MCNC: 126 MG/DL (ref 70–99)
HCT VFR BLD AUTO: 35.7 %
HGB BLD-MCNC: 11 G/DL
IMM GRANULOCYTES # BLD AUTO: 0.02 X10(3) UL (ref 0–1)
IMM GRANULOCYTES NFR BLD: 0.4 %
LYMPHOCYTES # BLD AUTO: 1.72 X10(3) UL (ref 1–4)
LYMPHOCYTES NFR BLD AUTO: 33.8 %
MCH RBC QN AUTO: 27 PG (ref 26–34)
MCHC RBC AUTO-ENTMCNC: 30.8 G/DL (ref 31–37)
MCV RBC AUTO: 87.7 FL
MONOCYTES # BLD AUTO: 0.63 X10(3) UL (ref 0.1–1)
MONOCYTES NFR BLD AUTO: 12.4 %
NEUTROPHILS # BLD AUTO: 2.17 X10 (3) UL (ref 1.5–7.7)
NEUTROPHILS # BLD AUTO: 2.17 X10(3) UL (ref 1.5–7.7)
NEUTROPHILS NFR BLD AUTO: 42.6 %
OSMOLALITY SERPL CALC.SUM OF ELEC: 296 MOSM/KG (ref 275–295)
PLATELET # BLD AUTO: 224 10(3)UL (ref 150–450)
POTASSIUM SERPL-SCNC: 4 MMOL/L (ref 3.5–5.1)
PROT SERPL-MCNC: 7.2 G/DL (ref 6.4–8.2)
RBC # BLD AUTO: 4.07 X10(6)UL
RHEUMATOID FACT SERPL-ACNC: <10 IU/ML (ref ?–15)
SODIUM SERPL-SCNC: 142 MMOL/L (ref 136–145)
WBC # BLD AUTO: 5.1 X10(3) UL (ref 4–11)

## 2022-11-30 PROCEDURE — 77067 SCR MAMMO BI INCL CAD: CPT | Performed by: INTERNAL MEDICINE

## 2022-11-30 PROCEDURE — 77063 BREAST TOMOSYNTHESIS BI: CPT | Performed by: INTERNAL MEDICINE

## 2022-11-30 PROCEDURE — 36415 COLL VENOUS BLD VENIPUNCTURE: CPT

## 2022-11-30 PROCEDURE — 85025 COMPLETE CBC W/AUTO DIFF WBC: CPT

## 2022-11-30 PROCEDURE — 80053 COMPREHEN METABOLIC PANEL: CPT

## 2022-11-30 PROCEDURE — 86431 RHEUMATOID FACTOR QUANT: CPT

## 2023-01-20 ENCOUNTER — TELEPHONE (OUTPATIENT)
Dept: INTERNAL MEDICINE CLINIC | Facility: CLINIC | Age: 83
End: 2023-01-20

## 2023-01-21 ENCOUNTER — LAB ENCOUNTER (OUTPATIENT)
Dept: LAB | Age: 83
End: 2023-01-21
Attending: Other
Payer: MEDICARE

## 2023-01-21 DIAGNOSIS — R41.0 CONFUSION: Primary | ICD-10-CM

## 2023-01-21 LAB
ALBUMIN SERPL-MCNC: 3.5 G/DL (ref 3.4–5)
ALBUMIN/GLOB SERPL: 0.9 {RATIO} (ref 1–2)
ALP LIVER SERPL-CCNC: 89 U/L
ALT SERPL-CCNC: 25 U/L
ANION GAP SERPL CALC-SCNC: 3 MMOL/L (ref 0–18)
AST SERPL-CCNC: 28 U/L (ref 15–37)
BASOPHILS # BLD AUTO: 0.04 X10(3) UL (ref 0–0.2)
BASOPHILS NFR BLD AUTO: 0.8 %
BILIRUB SERPL-MCNC: 0.4 MG/DL (ref 0.1–2)
BILIRUB UR QL CFM: NEGATIVE
BUN BLD-MCNC: 18 MG/DL (ref 7–18)
CALCIUM BLD-MCNC: 9.3 MG/DL (ref 8.5–10.1)
CHLORIDE SERPL-SCNC: 107 MMOL/L (ref 98–112)
CO2 SERPL-SCNC: 29 MMOL/L (ref 21–32)
COLOR UR AUTO: YELLOW
CREAT BLD-MCNC: 0.8 MG/DL
EOSINOPHIL # BLD AUTO: 0.22 X10(3) UL (ref 0–0.7)
EOSINOPHIL NFR BLD AUTO: 4.4 %
ERYTHROCYTE [DISTWIDTH] IN BLOOD BY AUTOMATED COUNT: 15.1 %
FASTING STATUS PATIENT QL REPORTED: YES
GFR SERPLBLD BASED ON 1.73 SQ M-ARVRAT: 74 ML/MIN/1.73M2 (ref 60–?)
GLOBULIN PLAS-MCNC: 4 G/DL (ref 2.8–4.4)
GLUCOSE BLD-MCNC: 110 MG/DL (ref 70–99)
GLUCOSE UR STRIP.AUTO-MCNC: NEGATIVE MG/DL
HCT VFR BLD AUTO: 35.3 %
HGB BLD-MCNC: 10.9 G/DL
HYALINE CASTS #/AREA URNS AUTO: PRESENT /LPF
HYALINE CASTS #/AREA URNS AUTO: PRESENT /LPF
IMM GRANULOCYTES # BLD AUTO: 0.01 X10(3) UL (ref 0–1)
IMM GRANULOCYTES NFR BLD: 0.2 %
LYMPHOCYTES # BLD AUTO: 1.8 X10(3) UL (ref 1–4)
LYMPHOCYTES NFR BLD AUTO: 35.8 %
MCH RBC QN AUTO: 26.1 PG (ref 26–34)
MCHC RBC AUTO-ENTMCNC: 30.9 G/DL (ref 31–37)
MCV RBC AUTO: 84.7 FL
MONOCYTES # BLD AUTO: 0.55 X10(3) UL (ref 0.1–1)
MONOCYTES NFR BLD AUTO: 10.9 %
NEUTROPHILS # BLD AUTO: 2.41 X10 (3) UL (ref 1.5–7.7)
NEUTROPHILS # BLD AUTO: 2.41 X10(3) UL (ref 1.5–7.7)
NEUTROPHILS NFR BLD AUTO: 47.9 %
NITRITE UR QL STRIP.AUTO: NEGATIVE
OSMOLALITY SERPL CALC.SUM OF ELEC: 291 MOSM/KG (ref 275–295)
PH UR STRIP.AUTO: 6 [PH] (ref 5–8)
PLATELET # BLD AUTO: 258 10(3)UL (ref 150–450)
POTASSIUM SERPL-SCNC: 3.8 MMOL/L (ref 3.5–5.1)
PROT SERPL-MCNC: 7.5 G/DL (ref 6.4–8.2)
PROT UR STRIP.AUTO-MCNC: >=300 MG/DL
RBC # BLD AUTO: 4.17 X10(6)UL
RBC #/AREA URNS AUTO: >10 /HPF
RBC #/AREA URNS AUTO: >10 /HPF
SODIUM SERPL-SCNC: 139 MMOL/L (ref 136–145)
SP GR UR STRIP.AUTO: >=1.03 (ref 1–1.03)
UROBILINOGEN UR STRIP.AUTO-MCNC: 0.2 MG/DL
WBC # BLD AUTO: 5 X10(3) UL (ref 4–11)
WBC #/AREA URNS AUTO: >50 /HPF
WBC #/AREA URNS AUTO: >50 /HPF

## 2023-01-21 PROCEDURE — 81001 URINALYSIS AUTO W/SCOPE: CPT

## 2023-01-21 PROCEDURE — 80053 COMPREHEN METABOLIC PANEL: CPT

## 2023-01-21 PROCEDURE — 87186 SC STD MICRODIL/AGAR DIL: CPT

## 2023-01-21 PROCEDURE — 87077 CULTURE AEROBIC IDENTIFY: CPT

## 2023-01-21 PROCEDURE — 87086 URINE CULTURE/COLONY COUNT: CPT

## 2023-01-21 PROCEDURE — 81015 MICROSCOPIC EXAM OF URINE: CPT

## 2023-01-21 PROCEDURE — 36415 COLL VENOUS BLD VENIPUNCTURE: CPT

## 2023-01-21 PROCEDURE — 85025 COMPLETE CBC W/AUTO DIFF WBC: CPT

## 2023-06-02 ENCOUNTER — LAB ENCOUNTER (OUTPATIENT)
Dept: LAB | Age: 83
End: 2023-06-02
Attending: INTERNAL MEDICINE
Payer: MEDICARE

## 2023-06-02 ENCOUNTER — OFFICE VISIT (OUTPATIENT)
Dept: INTERNAL MEDICINE CLINIC | Facility: CLINIC | Age: 83
End: 2023-06-02
Payer: COMMERCIAL

## 2023-06-02 VITALS
OXYGEN SATURATION: 98 % | RESPIRATION RATE: 16 BRPM | DIASTOLIC BLOOD PRESSURE: 72 MMHG | BODY MASS INDEX: 23.92 KG/M2 | HEIGHT: 63 IN | HEART RATE: 56 BPM | SYSTOLIC BLOOD PRESSURE: 128 MMHG | WEIGHT: 135 LBS

## 2023-06-02 DIAGNOSIS — R42 VERTIGO: Chronic | ICD-10-CM

## 2023-06-02 DIAGNOSIS — I10 PRIMARY HYPERTENSION: Chronic | ICD-10-CM

## 2023-06-02 DIAGNOSIS — M81.0 AGE-RELATED OSTEOPOROSIS WITHOUT CURRENT PATHOLOGICAL FRACTURE: Chronic | ICD-10-CM

## 2023-06-02 DIAGNOSIS — Z12.31 ENCOUNTER FOR SCREENING MAMMOGRAM FOR MALIGNANT NEOPLASM OF BREAST: ICD-10-CM

## 2023-06-02 DIAGNOSIS — M25.50 CHRONIC JOINT PAIN: ICD-10-CM

## 2023-06-02 DIAGNOSIS — M81.0 AGE-RELATED OSTEOPOROSIS WITHOUT CURRENT PATHOLOGICAL FRACTURE: ICD-10-CM

## 2023-06-02 DIAGNOSIS — E78.2 MIXED HYPERLIPIDEMIA: Chronic | ICD-10-CM

## 2023-06-02 DIAGNOSIS — G89.29 CHRONIC JOINT PAIN: ICD-10-CM

## 2023-06-02 DIAGNOSIS — G31.83 LEWY BODY DEMENTIA WITHOUT BEHAVIORAL DISTURBANCE (HCC): ICD-10-CM

## 2023-06-02 DIAGNOSIS — D64.9 NORMOCYTIC ANEMIA: ICD-10-CM

## 2023-06-02 DIAGNOSIS — I77.9 RIGHT-SIDED CAROTID ARTERY DISEASE, UNSPECIFIED TYPE (HCC): ICD-10-CM

## 2023-06-02 DIAGNOSIS — B00.1 RECURRENT COLD SORES: Chronic | ICD-10-CM

## 2023-06-02 DIAGNOSIS — Z87.440 HISTORY OF RECURRENT UTIS: ICD-10-CM

## 2023-06-02 DIAGNOSIS — Z00.00 PREVENTATIVE HEALTH CARE: ICD-10-CM

## 2023-06-02 DIAGNOSIS — F02.80 LEWY BODY DEMENTIA WITHOUT BEHAVIORAL DISTURBANCE (HCC): ICD-10-CM

## 2023-06-02 DIAGNOSIS — Z00.00 PREVENTATIVE HEALTH CARE: Primary | ICD-10-CM

## 2023-06-02 DIAGNOSIS — K21.9 GASTROESOPHAGEAL REFLUX DISEASE, UNSPECIFIED WHETHER ESOPHAGITIS PRESENT: Chronic | ICD-10-CM

## 2023-06-02 LAB
ANION GAP SERPL CALC-SCNC: 3 MMOL/L (ref 0–18)
BASOPHILS # BLD AUTO: 0.05 X10(3) UL (ref 0–0.2)
BASOPHILS NFR BLD AUTO: 0.7 %
BILIRUB UR QL STRIP.AUTO: NEGATIVE
BUN BLD-MCNC: 18 MG/DL (ref 7–18)
CALCIUM BLD-MCNC: 9.1 MG/DL (ref 8.5–10.1)
CHLORIDE SERPL-SCNC: 109 MMOL/L (ref 98–112)
CHOLEST SERPL-MCNC: 142 MG/DL (ref ?–200)
CO2 SERPL-SCNC: 28 MMOL/L (ref 21–32)
COLOR UR AUTO: YELLOW
CREAT BLD-MCNC: 0.79 MG/DL
EOSINOPHIL # BLD AUTO: 0.24 X10(3) UL (ref 0–0.7)
EOSINOPHIL NFR BLD AUTO: 3.6 %
ERYTHROCYTE [DISTWIDTH] IN BLOOD BY AUTOMATED COUNT: 14.6 %
FASTING PATIENT LIPID ANSWER: YES
FASTING STATUS PATIENT QL REPORTED: YES
GFR SERPLBLD BASED ON 1.73 SQ M-ARVRAT: 75 ML/MIN/1.73M2 (ref 60–?)
GLUCOSE BLD-MCNC: 109 MG/DL (ref 70–99)
GLUCOSE UR STRIP.AUTO-MCNC: NEGATIVE MG/DL
HCT VFR BLD AUTO: 35.6 %
HDLC SERPL-MCNC: 46 MG/DL (ref 40–59)
HGB BLD-MCNC: 11.1 G/DL
HYALINE CASTS #/AREA URNS AUTO: PRESENT /LPF
IMM GRANULOCYTES # BLD AUTO: 0.02 X10(3) UL (ref 0–1)
IMM GRANULOCYTES NFR BLD: 0.3 %
KETONES UR STRIP.AUTO-MCNC: NEGATIVE MG/DL
LDLC SERPL CALC-MCNC: 67 MG/DL (ref ?–100)
LYMPHOCYTES # BLD AUTO: 1.78 X10(3) UL (ref 1–4)
LYMPHOCYTES NFR BLD AUTO: 26.5 %
MCH RBC QN AUTO: 27.3 PG (ref 26–34)
MCHC RBC AUTO-ENTMCNC: 31.2 G/DL (ref 31–37)
MCV RBC AUTO: 87.5 FL
MONOCYTES # BLD AUTO: 0.78 X10(3) UL (ref 0.1–1)
MONOCYTES NFR BLD AUTO: 11.6 %
NEUTROPHILS # BLD AUTO: 3.84 X10 (3) UL (ref 1.5–7.7)
NEUTROPHILS # BLD AUTO: 3.84 X10(3) UL (ref 1.5–7.7)
NEUTROPHILS NFR BLD AUTO: 57.3 %
NITRITE UR QL STRIP.AUTO: NEGATIVE
NONHDLC SERPL-MCNC: 96 MG/DL (ref ?–130)
OSMOLALITY SERPL CALC.SUM OF ELEC: 292 MOSM/KG (ref 275–295)
PH UR STRIP.AUTO: 5 [PH] (ref 5–8)
PLATELET # BLD AUTO: 227 10(3)UL (ref 150–450)
POTASSIUM SERPL-SCNC: 3.8 MMOL/L (ref 3.5–5.1)
PROT UR STRIP.AUTO-MCNC: NEGATIVE MG/DL
RBC # BLD AUTO: 4.07 X10(6)UL
RBC UR QL AUTO: NEGATIVE
SODIUM SERPL-SCNC: 140 MMOL/L (ref 136–145)
SP GR UR STRIP.AUTO: 1.02 (ref 1–1.03)
TRIGL SERPL-MCNC: 174 MG/DL (ref 30–149)
UROBILINOGEN UR STRIP.AUTO-MCNC: <2 MG/DL
VIT D+METAB SERPL-MCNC: 39.1 NG/ML (ref 30–100)
VLDLC SERPL CALC-MCNC: 26 MG/DL (ref 0–30)
WBC # BLD AUTO: 6.7 X10(3) UL (ref 4–11)

## 2023-06-02 PROCEDURE — 80048 BASIC METABOLIC PNL TOTAL CA: CPT

## 2023-06-02 PROCEDURE — 1159F MED LIST DOCD IN RCRD: CPT | Performed by: INTERNAL MEDICINE

## 2023-06-02 PROCEDURE — 99214 OFFICE O/P EST MOD 30 MIN: CPT | Performed by: INTERNAL MEDICINE

## 2023-06-02 PROCEDURE — 3074F SYST BP LT 130 MM HG: CPT | Performed by: INTERNAL MEDICINE

## 2023-06-02 PROCEDURE — 1170F FXNL STATUS ASSESSED: CPT | Performed by: INTERNAL MEDICINE

## 2023-06-02 PROCEDURE — 82306 VITAMIN D 25 HYDROXY: CPT

## 2023-06-02 PROCEDURE — 36415 COLL VENOUS BLD VENIPUNCTURE: CPT

## 2023-06-02 PROCEDURE — 85025 COMPLETE CBC W/AUTO DIFF WBC: CPT

## 2023-06-02 PROCEDURE — 81001 URINALYSIS AUTO W/SCOPE: CPT | Performed by: INTERNAL MEDICINE

## 2023-06-02 PROCEDURE — 96160 PT-FOCUSED HLTH RISK ASSMT: CPT | Performed by: INTERNAL MEDICINE

## 2023-06-02 PROCEDURE — 3008F BODY MASS INDEX DOCD: CPT | Performed by: INTERNAL MEDICINE

## 2023-06-02 PROCEDURE — 1160F RVW MEDS BY RX/DR IN RCRD: CPT | Performed by: INTERNAL MEDICINE

## 2023-06-02 PROCEDURE — G0439 PPPS, SUBSEQ VISIT: HCPCS | Performed by: INTERNAL MEDICINE

## 2023-06-02 PROCEDURE — 3078F DIAST BP <80 MM HG: CPT | Performed by: INTERNAL MEDICINE

## 2023-06-02 PROCEDURE — 1125F AMNT PAIN NOTED PAIN PRSNT: CPT | Performed by: INTERNAL MEDICINE

## 2023-06-02 PROCEDURE — 80061 LIPID PANEL: CPT

## 2023-06-02 RX ORDER — METHYLPHENIDATE HYDROCHLORIDE 5 MG/1
5 TABLET ORAL DAILY
COMMUNITY
Start: 2023-04-19

## 2023-06-02 RX ORDER — TIMOLOL MALEATE 5 MG/ML
1 SOLUTION/ DROPS OPHTHALMIC
COMMUNITY
Start: 2023-03-28

## 2023-06-02 RX ORDER — LATANOPROST 50 UG/ML
1 SOLUTION/ DROPS OPHTHALMIC NIGHTLY
COMMUNITY
Start: 2023-02-05

## 2023-06-02 NOTE — PATIENT INSTRUCTIONS
- Blood pressure looks good  - Will continue current medications  - Get blood tests done today  - Will also check urine test to make sure infection is gone  - Schedule mammogram for November 30th or later  - Will refill medications when we get request from [facility] in October  - Follow up with me in 1 year for Medicare Wellness visit/chronic issues; follow up earlier as needed. It was a pleasure seeing you in the clinic today. Thank you for choosing the [facility] office for your healthcare needs. Please call at [phone] with any questions or concerns.     Robe Lopez MD

## 2023-07-10 ENCOUNTER — PATIENT MESSAGE (OUTPATIENT)
Dept: INTERNAL MEDICINE CLINIC | Facility: CLINIC | Age: 83
End: 2023-07-10

## 2023-07-10 DIAGNOSIS — M25.552 LEFT HIP PAIN: Primary | ICD-10-CM

## 2023-07-10 DIAGNOSIS — M79.605 LEFT LEG PAIN: ICD-10-CM

## 2023-07-10 NOTE — TELEPHONE ENCOUNTER
XR hip, lumbar spine ordered.   Will hold off on x-ray of leg as that is usually done after falls/accidents if we are suspecting a broken leg

## 2023-07-12 ENCOUNTER — HOSPITAL ENCOUNTER (OUTPATIENT)
Dept: GENERAL RADIOLOGY | Age: 83
Discharge: HOME | End: 2023-07-12
Attending: INTERNAL MEDICINE
Payer: MEDICARE

## 2023-07-12 DIAGNOSIS — M79.605 LEFT LEG PAIN: ICD-10-CM

## 2023-07-12 DIAGNOSIS — M25.552 LEFT HIP PAIN: ICD-10-CM

## 2023-07-12 PROCEDURE — 73502 X-RAY EXAM HIP UNI 2-3 VIEWS: CPT | Performed by: INTERNAL MEDICINE

## 2023-08-13 DIAGNOSIS — E78.2 MIXED HYPERLIPIDEMIA: Chronic | ICD-10-CM

## 2023-08-13 DIAGNOSIS — I77.9 RIGHT-SIDED CAROTID ARTERY DISEASE, UNSPECIFIED TYPE (HCC): Chronic | ICD-10-CM

## 2023-08-14 RX ORDER — EZETIMIBE AND SIMVASTATIN 10; 40 MG/1; MG/1
1 TABLET ORAL NIGHTLY
Qty: 90 TABLET | Refills: 3 | Status: SHIPPED | OUTPATIENT
Start: 2023-08-14

## 2023-08-14 NOTE — TELEPHONE ENCOUNTER
Ezetimibe-simvastatin 10-40 mg  Filled 10-22-22  Qty 90  3 refills  No upcoming appt.   LOV 6-02-23 RP  Labs 6-2-23 lipid

## 2023-08-21 DIAGNOSIS — I10 ESSENTIAL HYPERTENSION: Chronic | ICD-10-CM

## 2023-08-21 DIAGNOSIS — K21.9 GASTROESOPHAGEAL REFLUX DISEASE, UNSPECIFIED WHETHER ESOPHAGITIS PRESENT: Chronic | ICD-10-CM

## 2023-08-22 RX ORDER — AMLODIPINE BESYLATE 5 MG/1
5 TABLET ORAL DAILY
Qty: 90 TABLET | Refills: 3 | OUTPATIENT
Start: 2023-08-22

## 2023-08-22 RX ORDER — OMEPRAZOLE 40 MG/1
40 CAPSULE, DELAYED RELEASE ORAL
Qty: 90 CAPSULE | Refills: 3 | OUTPATIENT
Start: 2023-08-22

## 2023-08-22 NOTE — TELEPHONE ENCOUNTER
Omeprazole 40 MG Oral Capsule Delayed Release          Will file in chart as: OMEPRAZOLE 40 MG Oral Capsule Delayed Release    Sig: TAKE 1 CAPSULE BY MOUTH  DAILY BEFORE DINNER    Disp: 90 capsule    Refills: 3    Start: 8/21/2023    Class: Normal    Non-formulary For: Gastroesophageal reflux disease, unspecified whether esophagitis present    To pharmacy: Please send a replace/new response with 90-Day Supply if appropriate to maximize member benefit. Requesting 1 year supply. Last ordered: 10 months ago by Hao Vásquez MD Last refill: 7/7/2023    Rx #: [id number]        Name from pharmacy: amLODIPine Besylate 5 MG Oral Tablet         Will file in chart as: AMLODIPINE 5 MG Oral Tab    Sig: TAKE 1 TABLET BY MOUTH  DAILY    Disp: 90 tablet    Refills: 3    Start: 8/21/2023    Class: Normal    Non-formulary For: Essential hypertension    To pharmacy: Please send a replace/new response with 90-Day Supply if appropriate to maximize member benefit. Requesting 1 year supply.     Last ordered: 10 months ago by Hao Vásquez MD Last refill: 7/29/2023    Rx #: [id number]    Hypertension Medications Protocol [id number]/21/2023 05:12 PM   Protocol Details CMP or BMP in past 12 months    Last serum creatinine< 2.0    Appointment in past 6 or next 3 months        LOV 6/2/23  RTC 1 year  Both filled 10/22/22 for 1 year supply   Future Appointments   Date Time Provider Do [facility]   12/1/2023 11:00 AM LUPE ROJO RM1 LUPE Gonzáles

## 2023-09-22 DIAGNOSIS — I10 ESSENTIAL HYPERTENSION: Chronic | ICD-10-CM

## 2023-09-24 ENCOUNTER — PATIENT MESSAGE (OUTPATIENT)
Dept: INTERNAL MEDICINE CLINIC | Facility: CLINIC | Age: 83
End: 2023-09-24

## 2023-09-24 DIAGNOSIS — R53.83 FATIGUE, UNSPECIFIED TYPE: Primary | ICD-10-CM

## 2023-09-24 DIAGNOSIS — R40.0 DAYTIME SOMNOLENCE: ICD-10-CM

## 2023-09-25 RX ORDER — AMLODIPINE BESYLATE 5 MG/1
5 TABLET ORAL DAILY
Qty: 90 TABLET | Refills: 0 | Status: SHIPPED | OUTPATIENT
Start: 2023-09-25

## 2023-09-25 NOTE — TELEPHONE ENCOUNTER
amLODIPine Besylate 5 MG Oral Tablet          Will file in chart as: AMLODIPINE 5 MG Oral Tab    Sig: TAKE 1 TABLET BY MOUTH  DAILY    Disp: 90 tablet    Refills: 3    Start: 9/22/2023    Class: Normal    Non-formulary For: Essential hypertension    To pharmacy: Please send a replace/new response with 90-Day Supply if appropriate to maximize member benefit. Requesting 1 year supply.     Last ordered: 11 months ago (10/22/2022) by Nelida Pabon MD    Last refill: 7/29/2023    Rx #: [id number]    Hypertension Medications Protocol [id number]/22/2023 09:26 PM   Protocol Details CMP or BMP in past 12 months    Last serum creatinine< 2.0    Appointment in past 6 or next 3 months      LOV 6/2/2023  RTC 1 year  Filled one year supply 10/22/22   Last labs 6/2/2023  Future Appointments   Date Time Provider Do [facility]   12/1/2023 11:00 AM LUPE ROJO RM1 LUPE Gonzáles

## 2023-09-27 ENCOUNTER — LAB ENCOUNTER (OUTPATIENT)
Dept: LAB | Age: 83
End: 2023-09-27
Attending: INTERNAL MEDICINE
Payer: MEDICARE

## 2023-09-27 DIAGNOSIS — R40.0 DAYTIME SOMNOLENCE: ICD-10-CM

## 2023-09-27 DIAGNOSIS — R53.83 FATIGUE, UNSPECIFIED TYPE: ICD-10-CM

## 2023-09-27 LAB
ALBUMIN SERPL-MCNC: 4.1 G/DL (ref 3.4–5)
ALBUMIN/GLOB SERPL: 1.1 {RATIO} (ref 1–2)
ALP LIVER SERPL-CCNC: 75 U/L
ALT SERPL-CCNC: 40 U/L
ANION GAP SERPL CALC-SCNC: 6 MMOL/L (ref 0–18)
AST SERPL-CCNC: 36 U/L (ref 15–37)
BASOPHILS # BLD AUTO: 0.05 X10(3) UL (ref 0–0.2)
BASOPHILS NFR BLD AUTO: 0.9 %
BILIRUB SERPL-MCNC: 0.4 MG/DL (ref 0.1–2)
BILIRUB UR QL STRIP.AUTO: NEGATIVE
BUN BLD-MCNC: 27 MG/DL (ref 7–18)
CALCIUM BLD-MCNC: 9.3 MG/DL (ref 8.5–10.1)
CHLORIDE SERPL-SCNC: 109 MMOL/L (ref 98–112)
CO2 SERPL-SCNC: 27 MMOL/L (ref 21–32)
COLOR UR AUTO: YELLOW
CREAT BLD-MCNC: 1.23 MG/DL
EGFRCR SERPLBLD CKD-EPI 2021: 44 ML/MIN/1.73M2 (ref 60–?)
EOSINOPHIL # BLD AUTO: 0.24 X10(3) UL (ref 0–0.7)
EOSINOPHIL NFR BLD AUTO: 4.2 %
ERYTHROCYTE [DISTWIDTH] IN BLOOD BY AUTOMATED COUNT: 15.3 %
FASTING STATUS PATIENT QL REPORTED: NO
FOLATE SERPL-MCNC: >100 NG/ML (ref 8.7–?)
GLOBULIN PLAS-MCNC: 3.6 G/DL (ref 2.8–4.4)
GLUCOSE BLD-MCNC: 129 MG/DL (ref 70–99)
GLUCOSE UR STRIP.AUTO-MCNC: NORMAL MG/DL
HCT VFR BLD AUTO: 37 %
HGB BLD-MCNC: 11.7 G/DL
HYALINE CASTS #/AREA URNS AUTO: PRESENT /LPF
HYALINE CASTS #/AREA URNS AUTO: PRESENT /LPF
IMM GRANULOCYTES # BLD AUTO: 0.01 X10(3) UL (ref 0–1)
IMM GRANULOCYTES NFR BLD: 0.2 %
LEUKOCYTE ESTERASE UR QL STRIP.AUTO: 75
LYMPHOCYTES # BLD AUTO: 1.92 X10(3) UL (ref 1–4)
LYMPHOCYTES NFR BLD AUTO: 33.4 %
MCH RBC QN AUTO: 27.1 PG (ref 26–34)
MCHC RBC AUTO-ENTMCNC: 31.6 G/DL (ref 31–37)
MCV RBC AUTO: 85.6 FL
MONOCYTES # BLD AUTO: 0.56 X10(3) UL (ref 0.1–1)
MONOCYTES NFR BLD AUTO: 9.8 %
NEUTROPHILS # BLD AUTO: 2.96 X10 (3) UL (ref 1.5–7.7)
NEUTROPHILS # BLD AUTO: 2.96 X10(3) UL (ref 1.5–7.7)
NEUTROPHILS NFR BLD AUTO: 51.5 %
NITRITE UR QL STRIP.AUTO: NEGATIVE
OSMOLALITY SERPL CALC.SUM OF ELEC: 301 MOSM/KG (ref 275–295)
PH UR STRIP.AUTO: 5.5 [PH] (ref 5–8)
PLATELET # BLD AUTO: 194 10(3)UL (ref 150–450)
POTASSIUM SERPL-SCNC: 3.5 MMOL/L (ref 3.5–5.1)
PROT SERPL-MCNC: 7.7 G/DL (ref 6.4–8.2)
PROT UR STRIP.AUTO-MCNC: 70 MG/DL
RBC # BLD AUTO: 4.32 X10(6)UL
RBC UR QL AUTO: NEGATIVE
SODIUM SERPL-SCNC: 142 MMOL/L (ref 136–145)
SP GR UR STRIP.AUTO: >1.03 (ref 1–1.03)
T4 FREE SERPL-MCNC: 1 NG/DL (ref 0.8–1.7)
TSI SER-ACNC: 4.46 MIU/ML (ref 0.36–3.74)
UROBILINOGEN UR STRIP.AUTO-MCNC: NORMAL MG/DL
VIT B12 SERPL-MCNC: >2000 PG/ML (ref 193–986)
WBC # BLD AUTO: 5.7 X10(3) UL (ref 4–11)

## 2023-09-27 PROCEDURE — 82746 ASSAY OF FOLIC ACID SERUM: CPT

## 2023-09-27 PROCEDURE — 84443 ASSAY THYROID STIM HORMONE: CPT

## 2023-09-27 PROCEDURE — 84439 ASSAY OF FREE THYROXINE: CPT

## 2023-09-27 PROCEDURE — 36415 COLL VENOUS BLD VENIPUNCTURE: CPT

## 2023-09-27 PROCEDURE — 85025 COMPLETE CBC W/AUTO DIFF WBC: CPT

## 2023-09-27 PROCEDURE — 80053 COMPREHEN METABOLIC PANEL: CPT

## 2023-09-27 PROCEDURE — 81015 MICROSCOPIC EXAM OF URINE: CPT

## 2023-09-27 PROCEDURE — 87086 URINE CULTURE/COLONY COUNT: CPT

## 2023-09-27 PROCEDURE — 82607 VITAMIN B-12: CPT

## 2023-09-27 PROCEDURE — 81001 URINALYSIS AUTO W/SCOPE: CPT

## 2023-09-28 NOTE — TELEPHONE ENCOUNTER
Received call from [facility], she saw patient's results via [employer] and is concerned. States patient is in a state of confusion and she needs to be seen sooner than next Thursday. Would like to speak to a nurse.

## 2023-09-28 NOTE — TELEPHONE ENCOUNTER
Can schedule for 10/2 at 12:15 should be here by noon. Hopefully should have full urine culture result back by tomorrow (no bacteria/growth so far), will send in antibiotics if indicated based on that result. As far as her other lab results, her kidney levels are off slightly from the summer, could be acute dehydration. Other labs fine.

## 2023-09-28 NOTE — TELEPHONE ENCOUNTER
Spoke to Rikki Jerry on verbal release. Informed recommendations from PCP. Agrees to have patient come in for appt on 10/02. Patient will come in with son. Rikki Jerry is concerned about high Vitamin B12 level and high TSH. She thinks that it may be related to patient not drinking enough water. Patient is also taking Folbee rxd by Dr Arlene Sewell. She has been pushing patient to drink more water. As of late, RYAN reports patient has been sleeping a lot, is always fatigued and just wants to go to bed. Not eating well. Also notes that patient has brain fog. Please advise if any further recommendations prior to Monday appt, should supplements be held until appt? Do you feel that she may need additional labwork for thyroid? RYAN ok with [facility].

## 2023-09-28 NOTE — TELEPHONE ENCOUNTER
See message below from [facility]. I will call patient to triage but lab results from 09/28 not reviewed yet. UA Culture is prelim - patient scheduled for 10/05 appt with RP. Do you need to see patient sooner? Please advise?

## 2023-09-28 NOTE — TELEPHONE ENCOUNTER
The elevated kidney levels could be from her not drinking much water.   Her TSH is only slightly high, more importantly her T4 hormone is normal so less likely thyroid issues are causing her symptoms  As far as B12 levels, yes if she is taking any vitamins/supplements that have B12/b-complex would recommend holding that for now

## 2023-10-05 PROBLEM — G20.C PARKINSONISM: Status: ACTIVE | Noted: 2023-07-24

## 2023-10-05 PROBLEM — G20.C PARKINSONISM (HCC): Status: ACTIVE | Noted: 2023-07-24

## 2023-10-21 ENCOUNTER — LAB ENCOUNTER (OUTPATIENT)
Dept: LAB | Age: 83
End: 2023-10-21
Attending: INTERNAL MEDICINE
Payer: MEDICARE

## 2023-10-21 DIAGNOSIS — Z11.1 SCREENING-PULMONARY TB: ICD-10-CM

## 2023-10-21 PROCEDURE — 86480 TB TEST CELL IMMUN MEASURE: CPT

## 2023-10-21 PROCEDURE — 36415 COLL VENOUS BLD VENIPUNCTURE: CPT

## 2023-10-23 ENCOUNTER — TELEPHONE (OUTPATIENT)
Dept: INTERNAL MEDICINE CLINIC | Facility: CLINIC | Age: 83
End: 2023-10-23

## 2023-10-23 LAB
M TB IFN-G CD4+ T-CELLS BLD-ACNC: 0.08 IU/ML
M TB TUBERC IFN-G BLD QL: NEGATIVE
M TB TUBERC IGNF/MITOGEN IGNF CONTROL: 9.15 IU/ML
QFT TB1 AG MINUS NIL: -0.02 IU/ML
QFT TB2 AG MINUS NIL: -0.01 IU/ML

## 2023-10-23 NOTE — TELEPHONE ENCOUNTER
Kevin (son/poa) dropped off forms that need to be completed relatively soon due to pt moving into her new senior living facility on Wednesday. Forms emailed to forms dept. MARIOLA not completed in office. Fee not collected.    Forms mailed to forms dept via inter-dept mail    Please contact Anastacia Mckinley regarding update for forms at [phone]

## 2023-10-23 NOTE — TELEPHONE ENCOUNTER
Forms in my out basket - please contact son/daughter-in-law and go over/fill out answers to top of page 2, \"required services. \"  Otherwise forms completed/signed

## 2023-10-23 NOTE — TELEPHONE ENCOUNTER
Spoke with daniel informed her this is not a form we complete.  It should be completed by PCP forms archived

## 2023-10-23 NOTE — TELEPHONE ENCOUNTER
Forms dept unable to assist with forms. Pt son is asking if forms can be completed prior to Wednesday? Please advise.      (He is aware of standard turn around time)

## 2023-10-24 NOTE — TELEPHONE ENCOUNTER
Spoke with POA, forms completed and placed at . He will  tomorrow. Sent to scan. Placed in accordion.

## 2023-11-26 DIAGNOSIS — I10 ESSENTIAL HYPERTENSION: Chronic | ICD-10-CM

## 2023-11-27 RX ORDER — AMLODIPINE BESYLATE 5 MG/1
5 TABLET ORAL DAILY
Qty: 90 TABLET | Refills: 3 | Status: SHIPPED | OUTPATIENT
Start: 2023-11-27

## 2023-11-27 NOTE — TELEPHONE ENCOUNTER
amLODIPine Besylate 5 MG Oral Tablet          Will file in chart as: AMLODIPINE 5 MG Oral Tab    Sig: TAKE 1 TABLET BY MOUTH  DAILY    Original sig: TAKE 1 TABLET BY MOUTH DAILY    Disp: 90 tablet    Refills: 3    Start: 11/26/2023    Class: Normal    Non-formulary For: Essential hypertension    To pharmacy: Please send a replace/new response with 90-Day Supply if appropriate to maximize member benefit. Requesting 1 year supply.     Last ordered: 2 months ago (9/25/2023) by Waylon Hill MD    Last refill: 10/2/2023    Rx #: [id number]    Hypertension Medications Protocol [id number]/26/2023 10:30 PM   Protocol Details CMP or BMP in past 12 months    Last serum creatinine< 2.0    Appointment in past 6 or next 3 months      LOV 10/5/2023  RTC 3 months   Filled 9/25/23 90 tabs 0 refills   Last labs 9/27/2023  Future Appointments   Date Time Provider Do [facility]   12/1/2023 11:00 AM LUPE ROJO RM1 LUPE Gonzáles

## 2023-12-01 ENCOUNTER — HOSPITAL ENCOUNTER (OUTPATIENT)
Dept: MAMMOGRAPHY | Age: 83
Discharge: HOME | End: 2023-12-01
Attending: INTERNAL MEDICINE
Payer: MEDICARE

## 2023-12-01 DIAGNOSIS — Z12.31 ENCOUNTER FOR SCREENING MAMMOGRAM FOR MALIGNANT NEOPLASM OF BREAST: ICD-10-CM

## 2023-12-01 PROCEDURE — 77063 BREAST TOMOSYNTHESIS BI: CPT | Performed by: INTERNAL MEDICINE

## 2023-12-01 PROCEDURE — 77067 SCR MAMMO BI INCL CAD: CPT | Performed by: INTERNAL MEDICINE

## 2023-12-16 DIAGNOSIS — K21.9 GASTROESOPHAGEAL REFLUX DISEASE, UNSPECIFIED WHETHER ESOPHAGITIS PRESENT: Chronic | ICD-10-CM

## 2023-12-18 RX ORDER — OMEPRAZOLE 40 MG/1
40 CAPSULE, DELAYED RELEASE ORAL
Qty: 90 CAPSULE | Refills: 3 | Status: SHIPPED | OUTPATIENT
Start: 2023-12-18

## 2023-12-18 NOTE — TELEPHONE ENCOUNTER
Requested Prescriptions     Pending Prescriptions Disp Refills    OMEPRAZOLE 40 MG Oral Capsule Delayed Release [Pharmacy Med Name: Omeprazole 40 MG Oral Capsule Delayed Release] 90 capsule 3     Sig: TAKE 1 CAPSULE BY MOUTH  DAILY BEFORE DINNER     No protocol     LOV 10/5/23  RTC 3 months  Filled 10/22/22 90 caps 3 refills   No future appointments.

## 2024-02-20 NOTE — CONSULTS
[facility]  Cardiology Consultation    [address] [facility] Patient Status:  Observation    1940 MRN [id number]   [facility] 7NE-A Attending Joe Sol MD   Hosp Day # 0 PCP Jyothi Ellis     Reason for Consultation:  Dizziness 20.00 pack-year smoking history. She has never used smokeless tobacco. She reports that she does not drink alcohol or use drugs.     Allergies:    Erythromycin            Anaphylaxis    Comment:Swelling in the throat  Penicillins             Anaphylaxis non-tender. BS normal. No masses felt. Extremities: Without clubbing, cyanosis or edema. Neurologic: Alert and oriented, normal affect. No focal weakness detected in arms or legs. Skin: Warm and dry.  No rash    Laboratory Data:    Lab Results  Compon Never smoker

## 2024-02-26 ENCOUNTER — LAB ENCOUNTER (OUTPATIENT)
Dept: LAB | Age: 84
End: 2024-02-26
Attending: INTERNAL MEDICINE
Payer: MEDICARE

## 2024-02-26 DIAGNOSIS — F02.80 LEWY BODY DEMENTIA WITHOUT BEHAVIORAL DISTURBANCE (HCC): ICD-10-CM

## 2024-02-26 DIAGNOSIS — G31.83 LEWY BODY DEMENTIA WITHOUT BEHAVIORAL DISTURBANCE (HCC): ICD-10-CM

## 2024-02-26 DIAGNOSIS — M81.0 AGE-RELATED OSTEOPOROSIS WITHOUT CURRENT PATHOLOGICAL FRACTURE: ICD-10-CM

## 2024-02-26 DIAGNOSIS — Z87.440 HISTORY OF RECURRENT UTIS: ICD-10-CM

## 2024-02-26 DIAGNOSIS — D64.9 NORMOCYTIC ANEMIA: ICD-10-CM

## 2024-02-26 DIAGNOSIS — I10 PRIMARY HYPERTENSION: ICD-10-CM

## 2024-02-26 DIAGNOSIS — E78.2 MIXED HYPERLIPIDEMIA: Chronic | ICD-10-CM

## 2024-02-26 PROBLEM — I65.21 STENOSIS OF RIGHT CAROTID ARTERY: Status: ACTIVE | Noted: 2018-04-16

## 2024-02-26 PROBLEM — N18.30 CKD (CHRONIC KIDNEY DISEASE) STAGE 3, GFR 30-59 ML/MIN (HCC): Chronic | Status: ACTIVE | Noted: 2024-02-26

## 2024-02-26 PROBLEM — I65.21 STENOSIS OF RIGHT CAROTID ARTERY: Chronic | Status: ACTIVE | Noted: 2018-04-16

## 2024-02-26 LAB
ALBUMIN SERPL-MCNC: 4 G/DL (ref 3.4–5)
ALBUMIN/GLOB SERPL: 1.1 {RATIO} (ref 1–2)
ALP LIVER SERPL-CCNC: 112 U/L
ALT SERPL-CCNC: 75 U/L
ANION GAP SERPL CALC-SCNC: 2 MMOL/L (ref 0–18)
AST SERPL-CCNC: 45 U/L (ref 15–37)
BASOPHILS # BLD AUTO: 0.04 X10(3) UL (ref 0–0.2)
BASOPHILS NFR BLD AUTO: 0.6 %
BILIRUB SERPL-MCNC: 0.4 MG/DL (ref 0.1–2)
BILIRUB UR QL STRIP.AUTO: NEGATIVE
BUN BLD-MCNC: 23 MG/DL (ref 9–23)
CALCIUM BLD-MCNC: 9.3 MG/DL (ref 8.5–10.1)
CHLORIDE SERPL-SCNC: 107 MMOL/L (ref 98–112)
CHOLEST SERPL-MCNC: 144 MG/DL (ref ?–200)
CO2 SERPL-SCNC: 28 MMOL/L (ref 21–32)
COLOR UR AUTO: YELLOW
CREAT BLD-MCNC: 0.8 MG/DL
EGFRCR SERPLBLD CKD-EPI 2021: 74 ML/MIN/1.73M2 (ref 60–?)
EOSINOPHIL # BLD AUTO: 0.35 X10(3) UL (ref 0–0.7)
EOSINOPHIL NFR BLD AUTO: 5.2 %
ERYTHROCYTE [DISTWIDTH] IN BLOOD BY AUTOMATED COUNT: 14.8 %
FASTING PATIENT LIPID ANSWER: YES
FASTING STATUS PATIENT QL REPORTED: YES
GLOBULIN PLAS-MCNC: 3.7 G/DL (ref 2.8–4.4)
GLUCOSE BLD-MCNC: 111 MG/DL (ref 70–99)
GLUCOSE UR STRIP.AUTO-MCNC: NORMAL MG/DL
HCT VFR BLD AUTO: 37.9 %
HDLC SERPL-MCNC: 41 MG/DL (ref 40–59)
HGB BLD-MCNC: 11.9 G/DL
HYALINE CASTS #/AREA URNS AUTO: PRESENT /LPF
IMM GRANULOCYTES # BLD AUTO: 0.01 X10(3) UL (ref 0–1)
IMM GRANULOCYTES NFR BLD: 0.1 %
KETONES UR STRIP.AUTO-MCNC: NEGATIVE MG/DL
LDLC SERPL CALC-MCNC: 66 MG/DL (ref ?–100)
LEUKOCYTE ESTERASE UR QL STRIP.AUTO: 500
LYMPHOCYTES # BLD AUTO: 2.49 X10(3) UL (ref 1–4)
LYMPHOCYTES NFR BLD AUTO: 37.1 %
MCH RBC QN AUTO: 27 PG (ref 26–34)
MCHC RBC AUTO-ENTMCNC: 31.4 G/DL (ref 31–37)
MCV RBC AUTO: 86.1 FL
MONOCYTES # BLD AUTO: 0.73 X10(3) UL (ref 0.1–1)
MONOCYTES NFR BLD AUTO: 10.9 %
NEUTROPHILS # BLD AUTO: 3.1 X10 (3) UL (ref 1.5–7.7)
NEUTROPHILS # BLD AUTO: 3.1 X10(3) UL (ref 1.5–7.7)
NEUTROPHILS NFR BLD AUTO: 46.1 %
NITRITE UR QL STRIP.AUTO: NEGATIVE
NONHDLC SERPL-MCNC: 103 MG/DL (ref ?–130)
OSMOLALITY SERPL CALC.SUM OF ELEC: 288 MOSM/KG (ref 275–295)
PH UR STRIP.AUTO: 5.5 [PH] (ref 5–8)
PLATELET # BLD AUTO: 191 10(3)UL (ref 150–450)
POTASSIUM SERPL-SCNC: 3.3 MMOL/L (ref 3.5–5.1)
PROT SERPL-MCNC: 7.7 G/DL (ref 6.4–8.2)
PROT UR STRIP.AUTO-MCNC: 30 MG/DL
RBC # BLD AUTO: 4.4 X10(6)UL
RBC UR QL AUTO: NEGATIVE
SODIUM SERPL-SCNC: 137 MMOL/L (ref 136–145)
SP GR UR STRIP.AUTO: 1.02 (ref 1–1.03)
T4 FREE SERPL-MCNC: 0.8 NG/DL (ref 0.8–1.7)
TRIGL SERPL-MCNC: 225 MG/DL (ref 30–149)
TSI SER-ACNC: 6.98 MIU/ML (ref 0.36–3.74)
UROBILINOGEN UR STRIP.AUTO-MCNC: NORMAL MG/DL
VIT D+METAB SERPL-MCNC: 31.3 NG/ML (ref 30–100)
VLDLC SERPL CALC-MCNC: 34 MG/DL (ref 0–30)
WBC # BLD AUTO: 6.7 X10(3) UL (ref 4–11)
WBC #/AREA URNS AUTO: >50 /HPF
WBC CLUMPS UR QL AUTO: PRESENT /HPF

## 2024-02-26 PROCEDURE — 85025 COMPLETE CBC W/AUTO DIFF WBC: CPT

## 2024-02-26 PROCEDURE — 84439 ASSAY OF FREE THYROXINE: CPT

## 2024-02-26 PROCEDURE — 84443 ASSAY THYROID STIM HORMONE: CPT

## 2024-02-26 PROCEDURE — 36415 COLL VENOUS BLD VENIPUNCTURE: CPT

## 2024-02-26 PROCEDURE — 82306 VITAMIN D 25 HYDROXY: CPT

## 2024-02-26 PROCEDURE — 80061 LIPID PANEL: CPT

## 2024-02-26 PROCEDURE — 81001 URINALYSIS AUTO W/SCOPE: CPT

## 2024-02-26 PROCEDURE — 80053 COMPREHEN METABOLIC PANEL: CPT

## 2024-02-27 DIAGNOSIS — E03.8 SUBCLINICAL HYPOTHYROIDISM: Primary | ICD-10-CM

## 2024-02-27 PROBLEM — E78.1 HYPERTRIGLYCERIDEMIA: Status: ACTIVE | Noted: 2024-02-27

## 2024-02-27 PROBLEM — R74.8 ELEVATED LIVER ENZYMES: Status: ACTIVE | Noted: 2024-02-27

## 2024-02-27 RX ORDER — LEVOTHYROXINE SODIUM 0.03 MG/1
25 TABLET ORAL
Qty: 90 TABLET | Refills: 1 | Status: SHIPPED | OUTPATIENT
Start: 2024-02-27 | End: 2024-02-27

## 2024-04-02 ENCOUNTER — TELEPHONE (OUTPATIENT)
Dept: INTERNAL MEDICINE CLINIC | Facility: CLINIC | Age: 84
End: 2024-04-02

## 2024-04-17 ENCOUNTER — LAB ENCOUNTER (OUTPATIENT)
Dept: LAB | Age: 84
End: 2024-04-17
Attending: INTERNAL MEDICINE
Payer: MEDICARE

## 2024-04-17 DIAGNOSIS — E03.8 SUBCLINICAL HYPOTHYROIDISM: ICD-10-CM

## 2024-04-17 LAB
T4 FREE SERPL-MCNC: 1.1 NG/DL (ref 0.8–1.7)
TSI SER-ACNC: 6.86 MIU/ML (ref 0.55–4.78)

## 2024-04-17 PROCEDURE — 84443 ASSAY THYROID STIM HORMONE: CPT

## 2024-04-17 PROCEDURE — 84439 ASSAY OF FREE THYROXINE: CPT

## 2024-04-17 PROCEDURE — 36415 COLL VENOUS BLD VENIPUNCTURE: CPT

## 2024-04-18 DIAGNOSIS — E03.8 SUBCLINICAL HYPOTHYROIDISM: Primary | Chronic | ICD-10-CM

## 2024-04-18 RX ORDER — LEVOTHYROXINE SODIUM 0.05 MG/1
50 TABLET ORAL
Qty: 90 TABLET | Refills: 1 | Status: SHIPPED | OUTPATIENT
Start: 2024-04-18

## 2024-07-17 DIAGNOSIS — I77.9 RIGHT-SIDED CAROTID ARTERY DISEASE, UNSPECIFIED TYPE (HCC): Chronic | ICD-10-CM

## 2024-07-17 DIAGNOSIS — E78.2 MIXED HYPERLIPIDEMIA: Chronic | ICD-10-CM

## 2024-07-18 NOTE — TELEPHONE ENCOUNTER
LOV: 2/26/2024 with Dr. King  RTC: 6 months  Last Relevant Labs: February/April 2024  Filled: 8/14/2023    #90 with 3 refills    Future Appointments   Date Time Provider Department Center   9/5/2024  3:30 PM Nona King MD [facility] [place]

## 2024-07-19 RX ORDER — EZETIMIBE AND SIMVASTATIN 10; 40 MG/1; MG/1
1 TABLET ORAL NIGHTLY
Qty: 90 TABLET | Refills: 3 | Status: SHIPPED | OUTPATIENT
Start: 2024-07-19

## 2024-08-01 ENCOUNTER — EKG ENCOUNTER (OUTPATIENT)
Dept: LAB | Age: 84
End: 2024-08-01
Attending: CLINICAL NURSE SPECIALIST
Payer: MEDICARE

## 2024-08-01 DIAGNOSIS — Z51.81 MEDICATION MONITORING ENCOUNTER: Primary | ICD-10-CM

## 2024-08-01 LAB
ATRIAL RATE: 54 BPM
P AXIS: 66 DEGREES
P-R INTERVAL: 190 MS
Q-T INTERVAL: 482 MS
QRS DURATION: 88 MS
QTC CALCULATION (BEZET): 457 MS
R AXIS: -24 DEGREES
T AXIS: 60 DEGREES
VENTRICULAR RATE: 54 BPM

## 2024-08-01 PROCEDURE — 93010 ELECTROCARDIOGRAM REPORT: CPT | Performed by: INTERNAL MEDICINE

## 2024-08-01 PROCEDURE — 93005 ELECTROCARDIOGRAM TRACING: CPT

## 2024-08-19 DIAGNOSIS — E03.8 SUBCLINICAL HYPOTHYROIDISM: Chronic | ICD-10-CM

## 2024-08-19 RX ORDER — LEVOTHYROXINE SODIUM 0.05 MG/1
50 TABLET ORAL
Qty: 90 TABLET | Refills: 1 | Status: SHIPPED | OUTPATIENT
Start: 2024-08-19

## 2024-08-19 NOTE — TELEPHONE ENCOUNTER
LOV: 2/26/2024 with Dr. King   RTC: 6 months  Last Relevant Labs: 4/17/2024  Filled: 4/18/2024    #90 with 1 refill    Future Appointments   Date Time Provider Department Center   9/5/2024  3:30 PM Nona King MD [facility] [place]

## 2024-08-31 DIAGNOSIS — I10 ESSENTIAL HYPERTENSION: Chronic | ICD-10-CM

## 2024-08-31 DIAGNOSIS — F02.80 LEWY BODY DEMENTIA WITHOUT BEHAVIORAL DISTURBANCE (HCC): Chronic | ICD-10-CM

## 2024-08-31 DIAGNOSIS — R63.4 WEIGHT LOSS: ICD-10-CM

## 2024-08-31 DIAGNOSIS — G31.83 LEWY BODY DEMENTIA WITHOUT BEHAVIORAL DISTURBANCE (HCC): Chronic | ICD-10-CM

## 2024-09-03 RX ORDER — MIRTAZAPINE 15 MG/1
15 TABLET, FILM COATED ORAL NIGHTLY
Qty: 90 TABLET | Refills: 3 | Status: SHIPPED | OUTPATIENT
Start: 2024-09-03

## 2024-09-03 RX ORDER — AMLODIPINE BESYLATE 5 MG/1
5 TABLET ORAL DAILY
Qty: 90 TABLET | Refills: 3 | Status: SHIPPED | OUTPATIENT
Start: 2024-09-03

## 2024-09-05 ENCOUNTER — HOSPITAL ENCOUNTER (OUTPATIENT)
Age: 84
Discharge: HOME | End: 2024-09-05
Payer: MEDICARE

## 2024-09-05 VITALS
HEART RATE: 71 BPM | WEIGHT: 120 LBS | DIASTOLIC BLOOD PRESSURE: 61 MMHG | SYSTOLIC BLOOD PRESSURE: 145 MMHG | RESPIRATION RATE: 18 BRPM | TEMPERATURE: 98 F | HEIGHT: 63 IN | OXYGEN SATURATION: 95 % | BODY MASS INDEX: 21.26 KG/M2

## 2024-09-05 DIAGNOSIS — N30.91 CYSTITIS WITH HEMATURIA: Primary | ICD-10-CM

## 2024-09-05 LAB
GLUCOSE UR STRIP-MCNC: 100 MG/DL
KETONES UR STRIP-MCNC: 15 MG/DL
NITRITE UR QL STRIP: POSITIVE
PH UR STRIP: 5 [PH]
PROT UR STRIP-MCNC: >=300 MG/DL
SP GR UR STRIP: 1.02
UROBILINOGEN UR STRIP-ACNC: >=8 MG/DL

## 2024-09-05 PROCEDURE — 99213 OFFICE O/P EST LOW 20 MIN: CPT

## 2024-09-05 PROCEDURE — 81002 URINALYSIS NONAUTO W/O SCOPE: CPT

## 2024-09-05 PROCEDURE — 99214 OFFICE O/P EST MOD 30 MIN: CPT

## 2024-09-05 RX ORDER — NITROFURANTOIN 25; 75 MG/1; MG/1
100 CAPSULE ORAL 2 TIMES DAILY
Qty: 10 CAPSULE | Refills: 0 | Status: SHIPPED | OUTPATIENT
Start: 2024-09-05 | End: 2024-09-10

## 2024-09-05 NOTE — DISCHARGE INSTRUCTIONS
Urinary Tract Infection (UTI) supportive care measures:   - Take antibiotics as directed and to completion   - Drink plenty of water   - Avoid sugary drinks (e.g. Soda, juice, etc.)   - You may benefit from taking Probiotics whenever you take antibiotic to restore good gut bacteria   - You may benefit from Azo (Pyridium) to bladder pain relief until  - antibiotics start working   - Drinking alcohol can make you very sick and/or make the antibiotic less effective   - You may benefit from setting an alarm as a reminder to drink water throughout the day   - Follow up with your doctor as needed

## 2024-09-05 NOTE — ED INITIAL ASSESSMENT (HPI)
Dysuria, hematuria, frequency first reported to daughter yesterday night. Patient has hx of dementia. Daughter gave patient AZO today at around 0800.

## 2024-09-05 NOTE — ED PROVIDER NOTES
History     Chief Complaint   Patient presents with    Urinary Symptoms       Subjective:   HPI    Dori Oshea, 84 year old female with notable medical history of HTN, dementia, recurrent UTIs, CKD who presents with dysuria. Per patient and daughter, patient c/o dysuria yesterday w/o back pain, fever, chills, abdominal pain, n/v/d. Patient's daughter did give Azo for bladder relief. Patient difficulty historian given dementia.         Objective:   Past Medical History:    Anxiety    Breast CA (HCC)    DCIS    Cataract    removed right    Chronic cough    Ductal carcinoma in situ (DCIS) of right breast    Familial tremor    Frequent urination    Hallucinations    History of cancer of right breast    Hoarseness, chronic    S/P carotid endarterectomy    S/P right mastectomy    Trigger little finger of right hand    Trigger middle finger of right hand    Visual impairment              Past Surgical History:   Procedure Laterality Date    Cataract Right 2018    Colonoscopy      Hysterectomy      Intraocular lens insertion      Milo biopsy stereo nodule 1 site right (cpt=19081)      BENIGN    Milo biopsy stereo w/calc 3 site right (cpt=19081/95910(x2))  2017    DCIS    Mastectomy right  2017    DCIS    Sigmoidoscopy,diagnostic      Tonsillectomy                  Social History     Socioeconomic History    Marital status:     Number of children: 3   Tobacco Use    Smoking status: Former     Current packs/day: 0.00     Average packs/day: 1 pack/day for 20.0 years (20.0 ttl pk-yrs)     Types: Cigarettes     Start date: 1960     Quit date: 1980     Years since quittin.1    Smokeless tobacco: Never   Vaping Use    Vaping status: Never Used   Substance and Sexual Activity    Alcohol use: No    Drug use: No   Other Topics Concern    Caffeine Concern No    Exercise No     Social Determinants of Health      [facility] [employer]              No current  facility-administered medications on file prior to encounter.     Current Outpatient Medications on File Prior to Encounter   Medication Sig Dispense Refill    MIRTAZAPINE 15 MG Oral Tab TAKE 1 TABLET BY MOUTH EVERY  NIGHT 90 tablet 3    AMLODIPINE 5 MG Oral Tab TAKE 1 TABLET BY MOUTH DAILY 90 tablet 3    levothyroxine 50 MCG Oral Tab Take 1 tablet (50 mcg total) by mouth before breakfast. 90 tablet 1    EZETIMIBE-SIMVASTATIN 10-40 MG Oral Tab TAKE 1 TABLET BY MOUTH EVERY  NIGHT 90 tablet 3    Multiple Vitamins-Minerals (PRESERVISION AREDS) Oral Cap Take 1 capsule by mouth 2 (two) times daily.      Omeprazole 40 MG Oral Capsule Delayed Release Take 1 capsule (40 mg total) by mouth Before Dinner. 90 capsule 3    dorzolamide-timolol 22.3-6.8 MG/ML Ophthalmic Solution Place 1 drop into both eyes 2 (two) times daily.      QUEtiapine 50 MG Oral Tab Take 1 tablet (50 mg total) by mouth nightly.      QUEtiapine 25 MG Oral Tab Take 1 tablet (25 mg total) by mouth nightly.      memantine 10 MG Oral Tab Take 1 tablet (10 mg total) by mouth 2 (two) times daily.      donepezil 10 MG Oral Tab Take 1 tablet (10 mg total) by mouth daily.      PREVIDENT 5000 BOOSTER PLUS 1.1 % Dental Paste            Review of Systems   Genitourinary:  Positive for dysuria.   All other systems reviewed and are negative.        Constitutional and vital signs reviewed.      All other systems reviewed and negative except as noted above.    I have reviewed the family history, social history, allergies, and outpatient medications.     History reviewed from EMR: Encounters, problem list, allergies, medications      Physical Exam     ED Triage Vitals [09/05/24 1446]   /61   Pulse 71   Resp 18   Temp 98 °F (36.7 °C)   Temp src Temporal   SpO2 95 %   O2 Device None (Room air)       Current:/61   Pulse 71   Temp 98 °F (36.7 °C) (Temporal)   Resp 18   Ht 160 cm (5' 3\")   Wt 54.4 kg   SpO2 95%   BMI 21.26 kg/m²       Physical Exam  Vitals  and nursing note reviewed.   Constitutional:       General: She is not in acute distress.     Appearance: Normal appearance. She is normal weight. She is not ill-appearing or toxic-appearing.   HENT:      Head: Normocephalic and atraumatic.      Right Ear: External ear normal.      Left Ear: External ear normal.      Nose: Nose normal.      Mouth/Throat:      Mouth: Mucous membranes are moist.   Eyes:      Extraocular Movements: Extraocular movements intact.      Conjunctiva/sclera: Conjunctivae normal.      Pupils: Pupils are equal, round, and reactive to light.   Cardiovascular:      Rate and Rhythm: Normal rate.      Pulses: Normal pulses.   Pulmonary:      Effort: Pulmonary effort is normal. No respiratory distress.   Musculoskeletal:         General: No swelling, tenderness or signs of injury. Normal range of motion.      Cervical back: Normal range of motion and neck supple.   Skin:     General: Skin is warm and dry.      Capillary Refill: Capillary refill takes less than 2 seconds.      Coloration: Skin is not jaundiced.   Neurological:      General: No focal deficit present.      Mental Status: She is alert. Mental status is at baseline.   Psychiatric:         Mood and Affect: Mood normal.         Behavior: Behavior normal.         Thought Content: Thought content normal.         Judgment: Judgment normal.            ED Course     Labs Reviewed   [facility] POCT URINALYSIS DIPSTICK - Abnormal; Notable for the following components:       Result Value    Urine Color Orange (*)     Urine Clarity Slightly cloudy (*)     Protein urine >=300 (*)     Glucose, Urine 100 (*)     Ketone, Urine 15 (*)     Bilirubin, Urine Moderate (*)     Blood, Urine Trace-Intact (*)     Nitrite Urine Positive (*)     Urobilinogen urine >=8.0 (*)     Leukocyte esterase urine Large (*)     All other components within normal limits     No orders to display       Vitals:    09/05/24 1446   BP: 145/61   Pulse: 71   Resp: 18   Temp: 98 °F (36.7  °C)   TempSrc: Temporal   SpO2: 95%   Weight: 54.4 kg   Height: 160 cm (5' 3\")            [facility]        Dori Oshea, 84 year old female with medical history as noted above who presents with dysuria   - Patient in NAD, VSS   - UTI vs other   - Urine c/w infection; will treat   - Supportive care and infection control measures discussed   - f/u with primary care provider as needed        ** See ED course below for additional information on care provided / interventions / notable events throughout patient's encounter.         ** I have independently reviewed the radiology images, clinical lab results, and ECG tracings as described above (if applicable)    ** Concerning co-morbidities possibly affecting complaint / care: Dementia, CKD, Hx recurrent UTIs    ** See below for home care instructions (if applicable)          Medical Decision Making  Amount and/or Complexity of Data Reviewed  Independent Historian: caregiver     Details: Daughter (patient with dementia)  Labs: ordered. Decision-making details documented in ED Course.    Risk  OTC drugs.  Prescription drug management.        Disposition and Plan     Clinical Impression:  1. Cystitis with hematuria         Disposition:  Discharge  9/5/2024  3:22 pm    Follow-up:  Nona King MD  [address] [facility] [address]  [phone]      As needed          Medications Prescribed:  Discharge Medication List as of 9/5/2024  3:23 PM        START taking these medications    Details   nitrofurantoin monohydrate macro 100 MG Oral Cap Take 1 capsule (100 mg total) by mouth 2 (two) times daily for 5 days., Normal, Disp-10 capsule, R-0             The above patient (and/or guardian) was made aware that an appropriate evaluation has been performed, and that no additional testing is required at this time. In my medical judgment, there is currently no evidence of an immediate life-threatening or surgical condition, therefore discharge is indicated at this time. The  patient (and/or guardian) was advised that a small risk still exists that a serious condition could develop. The patient was instructed to arrange close follow-up with their primary care provider (or the referral provider given today). The patient received written and verbal instructions regarding their condition / concerns, demonstrated understanding, and is agreement with the outpatient treatment plan.        Home care instructions:    Urinary Tract Infection (UTI) supportive care measures:   - Take antibiotics as directed and to completion   - Drink plenty of water   - Avoid sugary drinks (e.g. Soda, juice, etc.)   - You may benefit from taking Probiotics whenever you take antibiotic to restore good gut bacteria   - You may benefit from Azo (Pyridium) to bladder pain relief until  - antibiotics start working   - Drinking alcohol can make you very sick and/or make the antibiotic less effective   - You may benefit from setting an alarm as a reminder to drink water throughout the day   - Follow up with your doctor as needed      Carlos Morales, DNP, APRN, AGACNP-BC, FNP-C, CNL  Adult-Gerontology Acute Care & Family Nurse Practitioner  [facility]

## 2024-10-12 ENCOUNTER — HOSPITAL ENCOUNTER (OUTPATIENT)
Age: 84
Discharge: HOME | End: 2024-10-12
Payer: MEDICARE

## 2024-10-12 VITALS
TEMPERATURE: 97 F | DIASTOLIC BLOOD PRESSURE: 68 MMHG | HEART RATE: 57 BPM | OXYGEN SATURATION: 95 % | SYSTOLIC BLOOD PRESSURE: 161 MMHG | RESPIRATION RATE: 16 BRPM | BODY MASS INDEX: 23.54 KG/M2 | HEIGHT: 67 IN | WEIGHT: 150 LBS

## 2024-10-12 DIAGNOSIS — N30.00 ACUTE CYSTITIS WITHOUT HEMATURIA: Primary | ICD-10-CM

## 2024-10-12 LAB
#MXD IC: 0.9 X10ˆ3/UL (ref 0.1–1)
BILIRUB UR QL STRIP: NEGATIVE
BUN BLD-MCNC: 21 MG/DL (ref 7–18)
CHLORIDE BLD-SCNC: 104 MMOL/L (ref 98–112)
CO2 BLD-SCNC: 24 MMOL/L (ref 21–32)
COLOR UR: YELLOW
CREAT BLD-MCNC: 0.9 MG/DL
EGFRCR SERPLBLD CKD-EPI 2021: 63 ML/MIN/1.73M2 (ref 60–?)
GLUCOSE BLD-MCNC: 140 MG/DL (ref 70–99)
GLUCOSE UR STRIP-MCNC: NEGATIVE MG/DL
HCT VFR BLD AUTO: 36.3 %
HCT VFR BLD CALC: 36 %
HGB BLD-MCNC: 10.9 G/DL
HGB UR QL STRIP: NEGATIVE
ISTAT IONIZED CALCIUM FOR CHEM 8: 1.21 MMOL/L (ref 1.12–1.32)
KETONES UR STRIP-MCNC: NEGATIVE MG/DL
LYMPHOCYTES # BLD AUTO: 2 X10ˆ3/UL (ref 1–4)
LYMPHOCYTES NFR BLD AUTO: 33.4 %
MCH RBC QN AUTO: 24.9 PG (ref 26–34)
MCHC RBC AUTO-ENTMCNC: 30 G/DL (ref 31–37)
MCV RBC AUTO: 83.1 FL (ref 80–100)
MIXED CELL %: 15.7 %
NEUTROPHILS # BLD AUTO: 3 X10ˆ3/UL (ref 1.5–7.7)
NEUTROPHILS NFR BLD AUTO: 50.9 %
NITRITE UR QL STRIP: NEGATIVE
PH UR STRIP: 5.5 [PH]
PLATELET # BLD AUTO: 203 X10ˆ3/UL (ref 150–450)
POTASSIUM BLD-SCNC: 3.5 MMOL/L (ref 3.6–5.1)
RBC # BLD AUTO: 4.37 X10ˆ6/UL
SODIUM BLD-SCNC: 143 MMOL/L (ref 136–145)
SP GR UR STRIP: >=1.03
UROBILINOGEN UR STRIP-ACNC: <2 MG/DL
WBC # BLD AUTO: 5.9 X10ˆ3/UL (ref 4–11)

## 2024-10-12 PROCEDURE — 85025 COMPLETE CBC W/AUTO DIFF WBC: CPT | Performed by: NURSE PRACTITIONER

## 2024-10-12 PROCEDURE — 96365 THER/PROPH/DIAG IV INF INIT: CPT

## 2024-10-12 PROCEDURE — 81002 URINALYSIS NONAUTO W/O SCOPE: CPT

## 2024-10-12 PROCEDURE — 80047 BASIC METABLC PNL IONIZED CA: CPT

## 2024-10-12 PROCEDURE — 99214 OFFICE O/P EST MOD 30 MIN: CPT

## 2024-10-12 PROCEDURE — 87086 URINE CULTURE/COLONY COUNT: CPT | Performed by: NURSE PRACTITIONER

## 2024-10-12 RX ORDER — CEPHALEXIN 500 MG/1
500 CAPSULE ORAL 4 TIMES DAILY
Qty: 28 CAPSULE | Refills: 0 | Status: SHIPPED | OUTPATIENT
Start: 2024-10-12 | End: 2024-10-17 | Stop reason: ALTCHOICE

## 2024-10-12 RX ORDER — CEPHALEXIN 500 MG/1
500 CAPSULE ORAL 4 TIMES DAILY
Qty: 28 CAPSULE | Refills: 0 | Status: SHIPPED | OUTPATIENT
Start: 2024-10-12 | End: 2024-10-12

## 2024-10-12 RX ORDER — SODIUM CHLORIDE 9 MG/ML
1000 INJECTION, SOLUTION INTRAVENOUS ONCE
Status: COMPLETED | OUTPATIENT
Start: 2024-10-12 | End: 2024-10-12

## 2024-10-12 NOTE — ED PROVIDER NOTES
Patient Seen in: [facility]      History     Chief Complaint   Patient presents with    Altered Mental Status    Fatigue     Stated Complaint: Fatigue, UTI    Subjective:   84-year-old female presents to immediate care for increased fatigue, dysuria.  Caregiver at bedside for HPI.  She states she was seen here last month treated for UTI.  Has noted over the last several days increased fatigue some confusion and urinary complaints.  Patient has a history of Lewy body dementia              Objective:     Past Medical History:    Anxiety    Breast CA (HCC)    DCIS    Cataract    removed right    Chronic cough    Ductal carcinoma in situ (DCIS) of right breast    Familial tremor    Frequent urination    Hallucinations    History of cancer of right breast    Hoarseness, chronic    S/P carotid endarterectomy    S/P right mastectomy    Trigger little finger of right hand    Trigger middle finger of right hand    Visual impairment              Past Surgical History:   Procedure Laterality Date    Cataract Right 2018    Colonoscopy      Hysterectomy      Intraocular lens insertion      Milo biopsy stereo nodule 1 site right (cpt=19081)  1990'S    BENIGN    Milo biopsy stereo w/calc 3 site right (cpt=19081/42236(x2))  07/2017    DCIS    Mastectomy right  08/25/2017    DCIS    Sigmoidoscopy,diagnostic      Tonsillectomy                  No pertinent social history.            Review of Systems   Constitutional: Negative.    Respiratory: Negative.     Cardiovascular: Negative.    Gastrointestinal: Negative.    Skin: Negative.    Neurological: Negative.        Positive for stated complaint: Fatigue, UTI  Other systems are as noted in HPI.  Constitutional and vital signs reviewed.      All other systems reviewed and negative except as noted above.    Physical Exam     ED Triage Vitals [10/12/24 1108]   /55   Pulse 57   Resp 20   Temp 97.3 °F (36.3 °C)   Temp src Temporal   SpO2 95 %   O2 Device None (Room  air)       Current Vitals:   Vital Signs  BP: (!) 161/68  Pulse: 57  Resp: 16  Temp: 97.3 °F (36.3 °C)  Temp src: Temporal    Oxygen Therapy  SpO2: 95 %  O2 Device: None (Room air)        Physical Exam  Vitals and nursing note reviewed.   Constitutional:       General: She is not in acute distress.  HENT:      Head: Normocephalic.      Nose: Nose normal.   Cardiovascular:      Rate and Rhythm: Normal rate.      Pulses: Normal pulses.   Pulmonary:      Effort: Pulmonary effort is normal.      Breath sounds: Normal breath sounds.   Abdominal:      General: Abdomen is flat.      Palpations: Abdomen is soft.      Tenderness: There is no right CVA tenderness or left CVA tenderness.   Musculoskeletal:         General: Normal range of motion.   Skin:     General: Skin is warm and dry.   Neurological:      General: No focal deficit present.      Mental Status: She is alert and oriented to person, place, and time.             ED Course     Labs Reviewed   POCT CBC - Abnormal; Notable for the following components:       Result Value    HGB IC 10.9 (*)     MCH IC 24.9 (*)     MCHC IC 30.0 (*)     All other components within normal limits   [facility] POCT URINALYSIS DIPSTICK - Abnormal; Notable for the following components:    Urine Clarity Slightly cloudy (*)     Protein urine Trace (*)     Leukocyte esterase urine Moderate (*)     All other components within normal limits   POCT ISTAT CHEM8 CARTRIDGE - Abnormal; Notable for the following components:    ISTAT BUN 21 (*)     ISTAT Potassium 3.5 (*)     ISTAT Glucose 140 (*)     All other components within normal limits   URINE CULTURE, ROUTINE                   MDM      Medical Decision Making  Pertinent Labs & Imaging studies reviewed. (See chart for details).  Patient coming in with confusion, dysuria, fatigue.   Differential diagnosis includes pyelonephritis, UTI  Will discharge on Ceftin.   Patient is comfortable with this plan.     Overall Pt looks good. Non-toxic, well-hydrated  and in no respiratory distress. Vital signs are reassuring. Exam is reassuring. I do not believe pt requires and additional diagnostic studies or intervention. I believe pt can be discharged home to continue evaluation as an outpatient. Follow-up provider given. Discharge instructions given and reviewed. Return for any problems. All understand and agrees with the plan.        Problems Addressed:  Acute cystitis without hematuria: acute illness or injury    Amount and/or Complexity of Data Reviewed  Labs: ordered. Decision-making details documented in ED Course.        Disposition and Plan     Clinical Impression:  1. Acute cystitis without hematuria         Disposition:  Discharge  10/12/2024  1:23 pm    Follow-up:  Nona King MD  [address] [facility] [address]  [phone]                Medications Prescribed:  Discharge Medication List as of 10/12/2024  1:26 PM              Supplementary Documentation:

## 2024-10-12 NOTE — ED INITIAL ASSESSMENT (HPI)
Sleeping excessively and tiredness in the last week.  Pt was treated  for UTI  back in 09/5.  Denies fever  at home.  Also c/o more confused  due to possible uti. Pt accompanied by daughter-in-law

## 2024-10-17 ENCOUNTER — OFFICE VISIT (OUTPATIENT)
Dept: INTERNAL MEDICINE CLINIC | Facility: CLINIC | Age: 84
End: 2024-10-17
Payer: COMMERCIAL

## 2024-10-17 VITALS
HEART RATE: 65 BPM | RESPIRATION RATE: 18 BRPM | SYSTOLIC BLOOD PRESSURE: 118 MMHG | WEIGHT: 143 LBS | OXYGEN SATURATION: 98 % | BODY MASS INDEX: 22.44 KG/M2 | TEMPERATURE: 97 F | DIASTOLIC BLOOD PRESSURE: 68 MMHG | HEIGHT: 67 IN

## 2024-10-17 DIAGNOSIS — F02.B2 MODERATE LEWY BODY DEMENTIA WITH PSYCHOTIC DISTURBANCE (HCC): ICD-10-CM

## 2024-10-17 DIAGNOSIS — D64.9 NORMOCYTIC ANEMIA: ICD-10-CM

## 2024-10-17 DIAGNOSIS — E03.8 SUBCLINICAL HYPOTHYROIDISM: Chronic | ICD-10-CM

## 2024-10-17 DIAGNOSIS — Z87.440 HISTORY OF RECURRENT UTIS: ICD-10-CM

## 2024-10-17 DIAGNOSIS — Z00.00 PREVENTATIVE HEALTH CARE: Primary | ICD-10-CM

## 2024-10-17 DIAGNOSIS — R42 VERTIGO: Chronic | ICD-10-CM

## 2024-10-17 DIAGNOSIS — G89.29 CHRONIC JOINT PAIN: ICD-10-CM

## 2024-10-17 DIAGNOSIS — R74.8 ELEVATED LIVER ENZYMES: ICD-10-CM

## 2024-10-17 DIAGNOSIS — N18.31 STAGE 3A CHRONIC KIDNEY DISEASE (HCC): Chronic | ICD-10-CM

## 2024-10-17 DIAGNOSIS — E78.2 MIXED HYPERLIPIDEMIA: Chronic | ICD-10-CM

## 2024-10-17 DIAGNOSIS — R73.9 ELEVATED RANDOM BLOOD GLUCOSE LEVEL: ICD-10-CM

## 2024-10-17 DIAGNOSIS — K21.9 GASTROESOPHAGEAL REFLUX DISEASE, UNSPECIFIED WHETHER ESOPHAGITIS PRESENT: Chronic | ICD-10-CM

## 2024-10-17 DIAGNOSIS — M25.50 CHRONIC JOINT PAIN: ICD-10-CM

## 2024-10-17 DIAGNOSIS — I10 PRIMARY HYPERTENSION: Chronic | ICD-10-CM

## 2024-10-17 DIAGNOSIS — I65.21 STENOSIS OF RIGHT CAROTID ARTERY: Chronic | ICD-10-CM

## 2024-10-17 DIAGNOSIS — M81.0 AGE-RELATED OSTEOPOROSIS WITHOUT CURRENT PATHOLOGICAL FRACTURE: Chronic | ICD-10-CM

## 2024-10-17 DIAGNOSIS — G31.83 MODERATE LEWY BODY DEMENTIA WITH PSYCHOTIC DISTURBANCE (HCC): ICD-10-CM

## 2024-10-17 DIAGNOSIS — Z23 NEED FOR IMMUNIZATION AGAINST INFLUENZA: ICD-10-CM

## 2024-10-17 PROBLEM — B00.1 RECURRENT COLD SORES: Chronic | Status: RESOLVED | Noted: 2021-02-09 | Resolved: 2024-10-17

## 2024-10-17 RX ORDER — QUETIAPINE FUMARATE 100 MG/1
100 TABLET, FILM COATED ORAL NIGHTLY
COMMUNITY
Start: 2024-09-25

## 2024-10-17 NOTE — PATIENT INSTRUCTIONS
- Get blood tests done when fasting  - Continue current medications  - Can follow up with me in the spring or summer (March - August) for your Annual Wellness Visit; follow up earlier as needed.    It was a pleasure seeing you in the clinic today.  Thank you for choosing the [facility] office for your healthcare needs. Please call at [phone] with any questions or concerns.    Nona King MD

## 2024-10-17 NOTE — PROGRESS NOTES
Subjective:   Dori Oshea is a 84 year old female who presents for a MA AHA (Medicare Advantage Annual Health Assessment) and Subsequent Annual Wellness visit (Pt already had Initial Annual Wellness) and scheduled follow up of multiple significant but stable problems.  Here with her daughter in law.    Preventative health - aged out of most screenings.  Hypertension - at goal blood pressure.  Hypercholesterolemia - on ezetimibe/statin therapy, tolerating.  Right carotid artery stenosis - asymptomatic.  CKD 3A - creatinine/GFR actually in good range past few times.  GERD - stable on PPI therapy.  Elevated liver enzymes - no jaundice or icterus.    Subclinical hypothyroidism - no new symptoms.  Lewy body dementia - following with Neurology ([facility]); on quetiapine, donepezil, mirtazapine, and memantine.  Chronic joint pain - stable joint pains.  History of recurrent UTI's - was at immediate care twice in past month with urinary symptoms.  Prescribed cephalexin at immediate care.    Normocytic anemia - no melena or hematochezia.  Vertigo - intermittent symptoms; on PRN meclizine.    History/Other:   Fall Risk Assessment:   She has been screened for Falls and is High Risk. Fall Prevention information provided to patient in After Visit Summary.    Do you feel unsteady when standing or walking?: (Patient-Rptd) Yes  Do you worry about falling?: (Patient-Rptd) Yes  Have you fallen in the past year?: (Patient-Rptd) No     Cognitive Assessment:   Abnormal  What day of the week is this?: Incorrect  What month is it?: Incorrect  What year is it?: Incorrect  Recall \"Ball\": Incorrect  Recall \"Flag\": Incorrect  Recall \"Tree\": Incorrect  MMSE SCORE: 0      Functional Ability/Status:   Dori Oshea has some abnormal functions as listed below:  She has Dressing and/or Bathing issues based on screening of functional status.  Difficulty dressing or bathing?: (Patient-Rptd) Yes  Bathing or Showering: (Patient-Rptd)  Cannot do without help  Dressing: (Patient-Rptd) Cannot do without help  She has Eating difficulties based on screening of functional status.She has Driving difficulties based on screening of functional status. She has Meal Preparation difficulties based on screening of functional status.She has difficulties Managing Money/Bills based on screening of functional status.She has difficulties Shopping for Groceries based on screening of functional status. She has difficulties Taking Meds as Rx'd based on screening of functional status. She has Vision problems based on screening of functional status. She has Walking problems based on screening of functional status. She has problems with Daily Activities based on screening of functional status. She has problems with Memory based on screening of functional status.       Depression Screening (PHQ):  PHQ-2 SCORE: 0  , done 10/11/2024   Last [facility] Suicide Screening on 10/17/2024 was No Risk.          Advanced Directives:   She does have a Living Will but we do NOT have it on file in Epic.    She does have a POA but we do NOT have it on file in [employer]    Patient has Advance Care Planning documents but we do not have a copy in EMR. Discussed Advanced Care Planning with patient and instructed patient to get our office a copy to be scanned into EMR.      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)    Normocytic anemia    Chronic joint pain    History of recurrent UTIs    CKD (chronic kidney disease) stage 3, GFR 30-59 ml/min ([facility])    Elevated liver enzymes    Subclinical hypothyroidism     Allergies:  She is allergic to erythromycin and penicillins.    Current Medications:  Outpatient Medications Marked as Taking for  the 10/17/24 encounter (Office Visit) with Nona King MD   Medication Sig    QUEtiapine 100 MG Oral Tab Take 1 tablet (100 mg total) by mouth nightly.    MIRTAZAPINE 15 MG Oral Tab TAKE 1 TABLET BY MOUTH EVERY  NIGHT    AMLODIPINE 5 MG Oral Tab TAKE 1 TABLET BY MOUTH DAILY    levothyroxine 50 MCG Oral Tab Take 1 tablet (50 mcg total) by mouth before breakfast.    EZETIMIBE-SIMVASTATIN 10-40 MG Oral Tab TAKE 1 TABLET BY MOUTH EVERY  NIGHT    PREVIDENT 5000 BOOSTER PLUS 1.1 % Dental Paste     Multiple Vitamins-Minerals (PRESERVISION AREDS) Oral Cap Take 1 capsule by mouth 2 (two) times daily.    Omeprazole 40 MG Oral Capsule Delayed Release Take 1 capsule (40 mg total) by mouth Before Dinner.    dorzolamide-timolol 22.3-6.8 MG/ML Ophthalmic Solution Place 1 drop into both eyes 2 (two) times daily.    QUEtiapine 25 MG Oral Tab Take 1 tablet (25 mg total) by mouth nightly.    memantine 10 MG Oral Tab Take 1 tablet (10 mg total) by mouth 2 (two) times daily.    donepezil 10 MG Oral Tab Take 1 tablet (10 mg total) by mouth daily.       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  Surgical History:  She  has a past surgical history that includes hysterectomy; tonsillectomy; cataract (Right, 2018); Intraocular lens insertion; mastectomy right (08/25/2017); ramandeep biopsy stereo nodule 1 site right (cpt=19081) (1990'S); ramandeep biopsy stereo w/calc 3 site right (cpt=19081/38176(x2))  (2017); colonoscopy; and sigmoidoscopy,diagnostic.   Family History:  Her family history includes Alcohol and Other Disorders Associated in her father; Breast Cancer (age of onset: 75) in her paternal aunt and self; Cancer in her father; Diabetes in her brother and daughter; Hypertension in her brother, daughter, father, and son; Lung Cancer in her brother; Mental Disorder in her daughter; Multiple Myeloma in her brother; Stroke in her sister.  Social History:  She  reports that she quit smoking about 44 years ago. Her smoking use included cigarettes. She started smoking about 64 years ago. She has a 20 pack-year smoking history. She has never used smokeless tobacco. She reports that she does not drink alcohol and does not use drugs.    Tobacco:  She smoked tobacco in the past but quit greater than 12 months ago.  Social History     Tobacco Use   Smoking Status Former    Current packs/day: 0.00    Average packs/day: 1 pack/day for 20.0 years (20.0 ttl pk-yrs)    Types: Cigarettes    Start date: 1960    Quit date: 1980    Years since quittin.2   Smokeless Tobacco Never        CAGE Alcohol Screen:   CAGE screening score of 0 on 10/11/2024, showing low risk of alcohol abuse.      Patient Care Team:  Nona King MD as PCP - General (Internal Medicine)  Gonzales Dimas MD (CARDIOLOGY)  Indy Read (OBSTETRICS & GYNECOLOGY)  Hafsa Banegas APRN as Breast Navigator (ONCOLOGY)  Ivonne Husain DO (GASTROENTEROLOGY)  Isai Villagomez as Consulting Physician (NEUROLOGY)  Mireya Browne MD as Consulting Physician (OPHTHALMOLOGY)    Review of Systems  GENERAL: feels well otherwise  SKIN: denies any unusual skin lesions  EYES: denies blurred vision or double vision  HEENT: denies nasal congestion, sinus pain or ST  LUNGS: denies shortness of breath with exertion  CARDIOVASCULAR: denies chest pain on exertion  GI: denies abdominal pain, denies heartburn  : denies dysuria, vaginal discharge or itching,  no complaint of urinary incontinence   MUSCULOSKELETAL: denies back pain  NEURO: dementia  PSYCHE: denies depression or anxiety  HEMATOLOGIC: denies hx of anemia  ENDOCRINE: denies thyroid history  ALL/ASTHMA: denies hx of allergy or asthma    Objective:   Physical Exam  /68   Pulse 65   Temp 97.4 °F (36.3 °C) (Temporal)   Resp 18   Ht 5' 7\" (1.702 m)   Wt 143 lb (64.9 kg)   SpO2 98%   BMI 22.40 kg/m²  Estimated body mass index is 22.4 kg/m² as calculated from the following:    Height as of this encounter: 5' 7\" (1.702 m).    Weight as of this encounter: 143 lb (64.9 kg).  Medicare Hearing Assessment:   Hearing Screening    Time taken: 10/17/2024  1:02 PM  Screening Method: Finger Rub  Finger Rub Result: Fail             GENERAL: Alert,in no apparent distress  SKIN: no rashes,no suspicious lesions  HEENT: atraumatic, PERRLA, EOMI, normal lid and conjunctiva  NECK: supple, no jvd, no thyromegaly  LUNGS: clear to auscultation bilaterally, no wheezing/rubs  CARDIO: RRR without murmurs.  No clubbing, cyanosis or edema.  GI: soft non tender nondistended no hepatosplenomegaly, bowel sounds throughout  PSYCH: pleasant, appropriate mood and affect    Assessment & Plan:   Dori Oshea is a 84 year old female who presents for a Medicare Assessment.   1. Preventative health care  2. Need for immunization against influenza  Aged out of most screenings. Body mass index is 22.4 kg/m².  Got her flu shot earlier this month.    3. Primary hypertension  At goal blood pressure.  Mild chronic hypokalemia, otherwise acceptable creatinine/electrolytes.  Will continue amlodipine 5 mg every day.    4. Mixed hyperlipidemia  Mildly elevated triglycerides in the beginning of the year.  Will continue ezetimibe-simvastatin 10-40 mg every day.    5. Stenosis of right carotid artery  Asymptomatic.  On statin therapy.    6. Stage 3a chronic kidney disease (HCC)  Creatinine/GFR actually improved over past year.  Continue to  monitor.    7. 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  Following with [facility].  Quetiapine dose recently increased.  On donepezil, memantine, quetiapine, and mirtazapine through Neurology.  - TSH and Free T4; Future    8. Gastroesophageal reflux disease, unspecified whether esophagitis present  On PPI therapy; continue.    9. Elevated liver enzymes  Mild elevated.  Possible fatty liver.  No jaundice or icterus.  Will check updated metabolic panel.  - Comp Metabolic Panel (14); Future    10. Subclinical hypothyroidism  Hair loss improved after starting levothyroxine.  Sleeping a lot of late - could be related more to her medications/dementia but will check updated thyroid labs.  Continue levothyroxine 50 mcg qAM for now.  - TSH and Free T4; Future    11. Age-related osteoporosis without current pathological fracture  No recent falls or fractures. May consider updated DEXA scan.    12. Chronic joint pain  Stable joint pains.  Some instability of late when walking - may be more related to her neurological issues.    13. History of recurrent UTIs  Seen at immediate care last week - initial urine dip with leukocytes but subsequent culture negative.  Some grooming issues - her granddaughters (CNA's themselves) will be helping her more with this.    14. Normocytic anemia  Mild anemia.  No melena or hematochezia.  Will continue to monitor.    15. Vertigo  Occasional intermittent vertigo, uses PRN meclizine; will continue.    16. Elevated random blood glucose level  Glucose high in the immediate care last week (non-fasting).  Daughter in law notes patient has been eating more sweets and carbs.  Will check fasting glucose and A1c.  - Comp Metabolic Panel (14); Future  - Hemoglobin A1C; Future    The patient indicates understanding of these issues and agrees to the plan.  Reinforced healthy diet, lifestyle, and exercise.      Return in  about 6 months (around 4/17/2025).     Nona King MD, 10/17/2024     Supplementary Documentation:   General Health:  In the past six months, have you lost more than 10 pounds without trying?: (Patient-Rptd) 2 - No  Has your appetite been poor?: (Patient-Rptd) No  Type of Diet: (Patient-Rptd) Other  How does the patient maintain a good energy level?: (Patient-Rptd) Other  How would you describe your daily physical activity?: (Patient-Rptd) None  How would you describe your current health state?: (Patient-Rptd) Poor  On a scale of 0 to 10, with 0 being no pain and 10 being severe pain, what is your pain level?: (Patient-Rptd) 0 - (None)  In the past six months, have you experienced urine leakage?: (Patient-Rptd) 1-Yes  At any time do you feel concerned for the safety/well-being of yourself and/or your children, in your home or elsewhere?: (Patient-Rptd) No  Have you had any immunizations at another office such as Influenza, Hepatitis B, Tetanus, or Pneumococcal?: (Patient-Rptd) Yes    Health Maintenance   Topic Date Due    MA Annual Health Assessment  01/01/2024    Annual Depression Screening  01/01/2024    COVID-19 Vaccine (5 - 2023-24 season) 09/01/2024    Influenza Vaccine (1) 10/01/2024    DEXA Scan  Completed    Fall Risk Screening (Annual)  Completed    Pneumococcal Vaccine: 65+ Years  Completed    Zoster Vaccines  Completed

## 2024-10-26 DIAGNOSIS — K21.9 GASTROESOPHAGEAL REFLUX DISEASE, UNSPECIFIED WHETHER ESOPHAGITIS PRESENT: Chronic | ICD-10-CM

## 2024-10-28 RX ORDER — OMEPRAZOLE 40 MG/1
40 CAPSULE, DELAYED RELEASE ORAL
Qty: 90 CAPSULE | Refills: 3 | Status: SHIPPED | OUTPATIENT
Start: 2024-10-28

## 2024-10-28 NOTE — TELEPHONE ENCOUNTER
LOV: 10/17/2024 with Dr. King  RTC: 6 months  Last Relevant Labs: 10/12/2024  Filled: 12/18/2023     #90 with 3 refills    No future appointments.

## 2024-11-01 ENCOUNTER — LAB ENCOUNTER (OUTPATIENT)
Dept: LAB | Age: 84
End: 2024-11-01
Attending: INTERNAL MEDICINE
Payer: MEDICARE

## 2024-11-01 DIAGNOSIS — R74.8 ELEVATED LIVER ENZYMES: ICD-10-CM

## 2024-11-01 DIAGNOSIS — G31.83 MODERATE LEWY BODY DEMENTIA WITH PSYCHOTIC DISTURBANCE (HCC): ICD-10-CM

## 2024-11-01 DIAGNOSIS — R73.9 ELEVATED RANDOM BLOOD GLUCOSE LEVEL: ICD-10-CM

## 2024-11-01 DIAGNOSIS — E03.8 SUBCLINICAL HYPOTHYROIDISM: Chronic | ICD-10-CM

## 2024-11-01 DIAGNOSIS — F02.B2 MODERATE LEWY BODY DEMENTIA WITH PSYCHOTIC DISTURBANCE (HCC): ICD-10-CM

## 2024-11-01 LAB
ALBUMIN SERPL-MCNC: 4.5 G/DL (ref 3.2–4.8)
ALBUMIN/GLOB SERPL: 1.5 {RATIO} (ref 1–2)
ALP LIVER SERPL-CCNC: 108 U/L
ALT SERPL-CCNC: 31 U/L
ANION GAP SERPL CALC-SCNC: 5 MMOL/L (ref 0–18)
AST SERPL-CCNC: 31 U/L (ref ?–34)
BILIRUB SERPL-MCNC: 0.5 MG/DL (ref 0.2–1.1)
BUN BLD-MCNC: 20 MG/DL (ref 9–23)
CALCIUM BLD-MCNC: 10 MG/DL (ref 8.7–10.4)
CHLORIDE SERPL-SCNC: 106 MMOL/L (ref 98–112)
CO2 SERPL-SCNC: 31 MMOL/L (ref 21–32)
CREAT BLD-MCNC: 0.99 MG/DL
EGFRCR SERPLBLD CKD-EPI 2021: 57 ML/MIN/1.73M2 (ref 60–?)
EST. AVERAGE GLUCOSE BLD GHB EST-MCNC: 151 MG/DL (ref 68–126)
FASTING STATUS PATIENT QL REPORTED: YES
GLOBULIN PLAS-MCNC: 3.1 G/DL (ref 2–3.5)
GLUCOSE BLD-MCNC: 131 MG/DL (ref 70–99)
HBA1C MFR BLD: 6.9 % (ref ?–5.7)
OSMOLALITY SERPL CALC.SUM OF ELEC: 298 MOSM/KG (ref 275–295)
POTASSIUM SERPL-SCNC: 3.2 MMOL/L (ref 3.5–5.1)
PROT SERPL-MCNC: 7.6 G/DL (ref 5.7–8.2)
SODIUM SERPL-SCNC: 142 MMOL/L (ref 136–145)
T4 FREE SERPL-MCNC: 1.2 NG/DL (ref 0.8–1.7)
TSI SER-ACNC: 5.36 UIU/ML (ref 0.55–4.78)

## 2024-11-01 PROCEDURE — 83036 HEMOGLOBIN GLYCOSYLATED A1C: CPT

## 2024-11-01 PROCEDURE — 84443 ASSAY THYROID STIM HORMONE: CPT

## 2024-11-01 PROCEDURE — 36415 COLL VENOUS BLD VENIPUNCTURE: CPT

## 2024-11-01 PROCEDURE — 80053 COMPREHEN METABOLIC PANEL: CPT

## 2024-11-01 PROCEDURE — 84439 ASSAY OF FREE THYROXINE: CPT

## 2024-11-03 DIAGNOSIS — E03.8 SUBCLINICAL HYPOTHYROIDISM: Chronic | ICD-10-CM

## 2024-11-03 DIAGNOSIS — N18.31 STAGE 3A CHRONIC KIDNEY DISEASE (HCC): Primary | Chronic | ICD-10-CM

## 2024-11-03 DIAGNOSIS — E11.9 DIET-CONTROLLED DIABETES MELLITUS (HCC): ICD-10-CM

## 2024-11-03 RX ORDER — LEVOTHYROXINE SODIUM 75 UG/1
75 TABLET ORAL
Qty: 90 TABLET | Refills: 1 | Status: SHIPPED | OUTPATIENT
Start: 2024-11-03

## 2025-01-20 ENCOUNTER — PATIENT MESSAGE (OUTPATIENT)
Dept: INTERNAL MEDICINE CLINIC | Facility: CLINIC | Age: 85
End: 2025-01-20

## 2025-01-20 DIAGNOSIS — E03.8 SUBCLINICAL HYPOTHYROIDISM: Chronic | ICD-10-CM

## 2025-01-20 DIAGNOSIS — N18.31 STAGE 3A CHRONIC KIDNEY DISEASE (HCC): Chronic | ICD-10-CM

## 2025-01-20 DIAGNOSIS — E11.9 DIET-CONTROLLED DIABETES MELLITUS (HCC): ICD-10-CM

## 2025-01-20 NOTE — TELEPHONE ENCOUNTER
Lab orders re-ordered with expected date of 4/1/25.  Patient scheduled for 30 minute follow up office visit for 4/8/25, can we reach out to her/her daughter in law ([place]) to reschedule earlier or later that day for 45 minute MA supervisit?

## 2025-01-20 NOTE — TELEPHONE ENCOUNTER
RP: Patient scheduled labs for 4/1/2025. Per your lab result note, you scheduled labs available after 4/15/2025. Do you want patient to wait until May? Please see MCM below:    \"From lab result note 11/3/2024: Recommend repeat labs/Medicare Wellness Visit in 6 months (May).  Let me know if you have questions about these results.\"   Written by Nona King MD on 11/3/2024 11:22 AM CST  Seen by patient Dori Oshea on 11/3/2024  1:36 PM

## 2025-03-08 DIAGNOSIS — E03.8 SUBCLINICAL HYPOTHYROIDISM: Chronic | ICD-10-CM

## 2025-03-10 NOTE — TELEPHONE ENCOUNTER
LOV: 10/17/2024 with Dr. King  RTC: 6 months  Last Relevant Labs: 11/1/2024  Filled: 11/3/2024    #90 with 1 refill    Future Appointments   Date Time Provider Department Center   4/1/2025  9:15 AM REF [facility] REF [facility] Ref [facility]   4/8/2025  9:00 AM Nona King MD [facility] [place]

## 2025-03-11 RX ORDER — LEVOTHYROXINE SODIUM 75 UG/1
75 TABLET ORAL
Qty: 90 TABLET | Refills: 3 | Status: SHIPPED | OUTPATIENT
Start: 2025-03-11

## 2025-03-21 ENCOUNTER — LAB ENCOUNTER (OUTPATIENT)
Dept: LAB | Age: 85
End: 2025-03-21
Attending: INTERNAL MEDICINE
Payer: MEDICARE

## 2025-03-21 DIAGNOSIS — E11.9 DIET-CONTROLLED DIABETES MELLITUS (HCC): ICD-10-CM

## 2025-03-21 DIAGNOSIS — N18.31 STAGE 3A CHRONIC KIDNEY DISEASE (HCC): Chronic | ICD-10-CM

## 2025-03-21 DIAGNOSIS — E03.8 SUBCLINICAL HYPOTHYROIDISM: Chronic | ICD-10-CM

## 2025-03-21 LAB
ALBUMIN SERPL-MCNC: 4.6 G/DL (ref 3.2–4.8)
ALBUMIN/GLOB SERPL: 1.6 {RATIO} (ref 1–2)
ALP LIVER SERPL-CCNC: 113 U/L
ALT SERPL-CCNC: 68 U/L
ANION GAP SERPL CALC-SCNC: 17 MMOL/L (ref 0–18)
AST SERPL-CCNC: 48 U/L (ref ?–34)
BILIRUB SERPL-MCNC: 0.4 MG/DL (ref 0.2–1.1)
BUN BLD-MCNC: 20 MG/DL (ref 9–23)
CALCIUM BLD-MCNC: 9.4 MG/DL (ref 8.7–10.6)
CHLORIDE SERPL-SCNC: 105 MMOL/L (ref 98–112)
CO2 SERPL-SCNC: 22 MMOL/L (ref 21–32)
CREAT BLD-MCNC: 0.79 MG/DL
EGFRCR SERPLBLD CKD-EPI 2021: 74 ML/MIN/1.73M2 (ref 60–?)
EST. AVERAGE GLUCOSE BLD GHB EST-MCNC: 157 MG/DL (ref 68–126)
FASTING STATUS PATIENT QL REPORTED: YES
GLOBULIN PLAS-MCNC: 2.8 G/DL (ref 2–3.5)
GLUCOSE BLD-MCNC: 112 MG/DL (ref 70–99)
HBA1C MFR BLD: 7.1 % (ref ?–5.7)
OSMOLALITY SERPL CALC.SUM OF ELEC: 301 MOSM/KG (ref 275–295)
POTASSIUM SERPL-SCNC: 3.5 MMOL/L (ref 3.5–5.1)
PROT SERPL-MCNC: 7.4 G/DL (ref 5.7–8.2)
SODIUM SERPL-SCNC: 144 MMOL/L (ref 136–145)
T4 FREE SERPL-MCNC: 1.2 NG/DL (ref 0.8–1.7)
TSI SER-ACNC: 1.33 UIU/ML (ref 0.55–4.78)

## 2025-03-21 PROCEDURE — 84439 ASSAY OF FREE THYROXINE: CPT

## 2025-03-21 PROCEDURE — 80053 COMPREHEN METABOLIC PANEL: CPT

## 2025-03-21 PROCEDURE — 83036 HEMOGLOBIN GLYCOSYLATED A1C: CPT

## 2025-03-21 PROCEDURE — 36415 COLL VENOUS BLD VENIPUNCTURE: CPT

## 2025-03-21 PROCEDURE — 84443 ASSAY THYROID STIM HORMONE: CPT

## 2025-04-08 ENCOUNTER — OFFICE VISIT (OUTPATIENT)
Dept: INTERNAL MEDICINE CLINIC | Facility: CLINIC | Age: 85
End: 2025-04-08
Payer: COMMERCIAL

## 2025-04-08 VITALS
TEMPERATURE: 98 F | WEIGHT: 130.81 LBS | BODY MASS INDEX: 22.33 KG/M2 | SYSTOLIC BLOOD PRESSURE: 136 MMHG | DIASTOLIC BLOOD PRESSURE: 62 MMHG | HEART RATE: 64 BPM | OXYGEN SATURATION: 98 % | HEIGHT: 64.25 IN | RESPIRATION RATE: 16 BRPM

## 2025-04-08 DIAGNOSIS — K21.9 GASTROESOPHAGEAL REFLUX DISEASE, UNSPECIFIED WHETHER ESOPHAGITIS PRESENT: Chronic | ICD-10-CM

## 2025-04-08 DIAGNOSIS — Z00.00 ENCOUNTER FOR SUBSEQUENT ANNUAL WELLNESS VISIT (AWV) IN MEDICARE PATIENT: Primary | ICD-10-CM

## 2025-04-08 DIAGNOSIS — R74.8 ELEVATED LIVER ENZYMES: ICD-10-CM

## 2025-04-08 DIAGNOSIS — R42 VERTIGO: Chronic | ICD-10-CM

## 2025-04-08 DIAGNOSIS — E11.9 DIET-CONTROLLED DIABETES MELLITUS (HCC): Chronic | ICD-10-CM

## 2025-04-08 DIAGNOSIS — I10 PRIMARY HYPERTENSION: Chronic | ICD-10-CM

## 2025-04-08 DIAGNOSIS — E78.2 MIXED HYPERLIPIDEMIA: Chronic | ICD-10-CM

## 2025-04-08 DIAGNOSIS — Z87.440 HISTORY OF RECURRENT UTIS: ICD-10-CM

## 2025-04-08 DIAGNOSIS — I65.21 STENOSIS OF RIGHT CAROTID ARTERY: Chronic | ICD-10-CM

## 2025-04-08 DIAGNOSIS — E03.8 SUBCLINICAL HYPOTHYROIDISM: Chronic | ICD-10-CM

## 2025-04-08 DIAGNOSIS — F02.B2 MODERATE LEWY BODY DEMENTIA WITH PSYCHOTIC DISTURBANCE (HCC): ICD-10-CM

## 2025-04-08 DIAGNOSIS — M81.0 AGE-RELATED OSTEOPOROSIS WITHOUT CURRENT PATHOLOGICAL FRACTURE: Chronic | ICD-10-CM

## 2025-04-08 DIAGNOSIS — D64.9 NORMOCYTIC ANEMIA: ICD-10-CM

## 2025-04-08 DIAGNOSIS — G31.83 MODERATE LEWY BODY DEMENTIA WITH PSYCHOTIC DISTURBANCE (HCC): ICD-10-CM

## 2025-04-08 DIAGNOSIS — N18.31 STAGE 3A CHRONIC KIDNEY DISEASE (HCC): Chronic | ICD-10-CM

## 2025-04-08 PROBLEM — G89.29 CHRONIC JOINT PAIN: Status: RESOLVED | Noted: 2023-06-02 | Resolved: 2025-04-08

## 2025-04-08 PROBLEM — M25.50 CHRONIC JOINT PAIN: Status: RESOLVED | Noted: 2023-06-02 | Resolved: 2025-04-08

## 2025-04-08 PROCEDURE — 99214 OFFICE O/P EST MOD 30 MIN: CPT | Performed by: INTERNAL MEDICINE

## 2025-04-08 PROCEDURE — 3008F BODY MASS INDEX DOCD: CPT | Performed by: INTERNAL MEDICINE

## 2025-04-08 PROCEDURE — 1125F AMNT PAIN NOTED PAIN PRSNT: CPT | Performed by: INTERNAL MEDICINE

## 2025-04-08 PROCEDURE — G0439 PPPS, SUBSEQ VISIT: HCPCS | Performed by: INTERNAL MEDICINE

## 2025-04-08 PROCEDURE — 1170F FXNL STATUS ASSESSED: CPT | Performed by: INTERNAL MEDICINE

## 2025-04-08 PROCEDURE — 96160 PT-FOCUSED HLTH RISK ASSMT: CPT | Performed by: INTERNAL MEDICINE

## 2025-04-08 PROCEDURE — 3075F SYST BP GE 130 - 139MM HG: CPT | Performed by: INTERNAL MEDICINE

## 2025-04-08 PROCEDURE — 99499 UNLISTED E&M SERVICE: CPT | Performed by: INTERNAL MEDICINE

## 2025-04-08 PROCEDURE — 1160F RVW MEDS BY RX/DR IN RCRD: CPT | Performed by: INTERNAL MEDICINE

## 2025-04-08 PROCEDURE — 1159F MED LIST DOCD IN RCRD: CPT | Performed by: INTERNAL MEDICINE

## 2025-04-08 PROCEDURE — 3078F DIAST BP <80 MM HG: CPT | Performed by: INTERNAL MEDICINE

## 2025-04-08 NOTE — PROGRESS NOTES
Subjective:   Dori Oshea is a 84 year old female who presents for a MA AHA (Medicare Advantage Annual Health Assessment) and Subsequent Annual Wellness visit (Pt already had Initial Annual Wellness) and scheduled follow up of multiple significant but stable problems.  Here with her daughter in law.  Preventative health - aged out of most screenings.  Hypertension, CKD - at goal blood pressure.  No anasarca.    Hyperlipidemia - on Vytorin therapy; tolerating.  Right carotid artery stenosis - asymptomatic.  Diet controlled diabetes mellitus - A1c now at 7.1%.  Subclinical hypothyroidism - no new symptoms; memory issues but this is more related to her dementia.  Osteoporosis - no recent falls/fractures.  GERD - stable on prescription omeprazole therapy.  Elevated liver enzymes - no jaundice or icterus.  History of recurrent UTI's - no recent acute urinary or constitutional symptoms.  Normocytic anemia - no melena or hematochezia.  Vertigo - chronic, intermittent issue; on PRN meclizine.  Lewy body dementia - continues follow up with Neurology ([facility]), last office visit November 2024.  Daughter in law notes patient has been more confused at times of late.  Did not recognize her son yesterday (first time this has happened).    History/Other:   Fall Risk Assessment:   She has been screened for Falls and is High Risk. Fall Prevention information provided to patient in After Visit Summary.    Do you feel unsteady when standing or walking?: (Patient-Rptd) Yes  Do you worry about falling?: (Patient-Rptd) No  Have you fallen in the past year?: (Patient-Rptd) No     Cognitive Assessment:   Abnormal  What day of the week is this?: Incorrect (Unable due to Lewy Body Dementia)  What month is it?: Incorrect  What year is it?: Incorrect  Recall \"Ball\": Incorrect (Unable due to Lewy Body Dementia)  Recall \"Flag\": Incorrect  Recall \"Tree\": Incorrect      Functional Ability/Status:   Dori Oshea has some  abnormal functions as listed below:  She has Dressing and/or Bathing issues based on screening of functional status.  Difficulty dressing or bathing?: (Patient-Rptd) Yes  Bathing or Showering: (Patient-Rptd) Cannot do without help  Dressing: (Patient-Rptd) Need some help  She has Driving difficulties based on screening of functional status. She has Meal Preparation difficulties based on screening of functional status.She has difficulties Managing Money/Bills based on screening of functional status.She has difficulties Shopping for Groceries based on screening of functional status. She has difficulties Taking Meds as Rx'd based on screening of functional status. She has problems with Daily Activities based on screening of functional status. She has problems with Memory based on screening of functional status.       Depression Screening (PHQ):  PHQ-2 SCORE: 0  , done 4/8/2025   Last [place] Suicide Screening on 4/8/2025 was No Risk.            Advanced Directives:   She does have a Living Will but we do NOT have it on file in Epic.    She does have a POA but we do NOT have it on file in [employer].    Patient has Advance Care Planning documents but we do not have a copy in EMR. Discussed Advanced Care Planning with patient and instructed patient to get our office a copy to be scanned into EMR.      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    Current Medications:  Outpatient Medications  Marked as Taking for the 4/8/25 encounter (Office Visit) with Nona King MD   Medication Sig    levothyroxine 75 MCG Oral Tab Take 1 tablet (75 mcg total) by mouth before breakfast.    OMEPRAZOLE 40 MG Oral Capsule Delayed Release TAKE 1 CAPSULE BY MOUTH BEFORE  DINNER    QUEtiapine 100 MG Oral Tab Take 1 tablet (100 mg total) by mouth nightly.    Cholecalciferol 50 MCG (2000 UT) Oral Tab Take 50 mcg by mouth daily.    MIRTAZAPINE 15 MG Oral Tab TAKE 1 TABLET BY MOUTH EVERY  NIGHT    AMLODIPINE 5 MG Oral Tab TAKE 1 TABLET BY MOUTH DAILY    EZETIMIBE-SIMVASTATIN 10-40 MG Oral Tab TAKE 1 TABLET BY MOUTH EVERY  NIGHT    PREVIDENT 5000 BOOSTER PLUS 1.1 % Dental Paste     Multiple Vitamins-Minerals (PRESERVISION AREDS) Oral Cap Take 1 capsule by mouth 2 (two) times daily.    dorzolamide-timolol 22.3-6.8 MG/ML Ophthalmic Solution Place 1 drop into both eyes 2 (two) times daily.    memantine 10 MG Oral Tab Take 1 tablet (10 mg total) by mouth 2 (two) times daily.    donepezil 10 MG Oral Tab Take 1 tablet (10 mg total) by mouth daily.       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  Surgical History:  She  has a past surgical history that includes hysterectomy; tonsillectomy; cataract (Right, 2018); Intraocular lens insertion; mastectomy right (08/25/2017); ramandeep biopsy stereo nodule 1 site right (cpt=19081) (1990'S); ramandeep biopsy stereo w/calc 3 site  right (cpt=19081/79731(x2)) (07/2017); colonoscopy; and sigmoidoscopy,diagnostic.   Family History:  Her family history includes Alcohol and Other Disorders Associated in her father; Breast Cancer (age of onset: 75) in her paternal aunt and self; Cancer in her father; Diabetes in her brother and daughter; Hypertension in her brother, daughter, father, and son; Lung Cancer in her brother; Mental Disorder in her daughter; Multiple Myeloma in her brother; Stroke in her sister.  Social History:  She  reports that she quit smoking about 44 years ago. Her smoking use included cigarettes. She started smoking about 64 years ago. She has a 20 pack-year smoking history. She has never used smokeless tobacco. She reports that she does not drink alcohol and does not use drugs.    Tobacco:  She smoked tobacco in the past but quit greater than 12 months ago.  Tobacco Use[1]     CAGE Alcohol Screen:   CAGE screening score of 0 on 4/1/2025, showing low risk of alcohol abuse.      Patient Care Team:  Nona King MD as PCP - General (Internal Medicine)  Gonzales Dimas MD (CARDIOLOGY)  Indy Read (OBSTETRICS & GYNECOLOGY)  Hafsa Banegas APRN as Breast Navigator (ONCOLOGY)  Ivonne Husain DO (GASTROENTEROLOGY)  Isai Villagomez as Consulting Physician (NEUROLOGY)  Mireya Browne MD as Consulting Physician (OPHTHALMOLOGY)    Review of Systems  Per patient and family:  GENERAL: feels well otherwise  SKIN: denies any unusual skin lesions  EYES: denies blurred vision or double vision  HEENT: denies nasal congestion, sinus pain or ST  LUNGS: denies shortness of breath with exertion  CARDIOVASCULAR: denies chest pain on exertion  GI: denies abdominal pain, denies heartburn  : denies dysuria, vaginal discharge or itching, no complaint of urinary incontinence   MUSCULOSKELETAL: denies back pain  NEURO: dementia/memory issues  PSYCHE: denies depression or anxiety  HEMATOLOGIC: denies hx of anemia  ENDOCRINE: denies thyroid  history  ALL/ASTHMA: denies hx of allergy or asthma    Objective:   Physical Exam  /62 (BP Location: Left arm, Patient Position: Sitting, Cuff Size: adult)   Pulse 64   Temp 98 °F (36.7 °C) (Oral)   Resp 16   Ht 5' 4.25\" (1.632 m)   Wt 130 lb 12.8 oz (59.3 kg)   SpO2 98%   Breastfeeding No   BMI 22.28 kg/m²  Estimated body mass index is 22.28 kg/m² as calculated from the following:    Height as of this encounter: 5' 4.25\" (1.632 m).    Weight as of this encounter: 130 lb 12.8 oz (59.3 kg).  Medicare Hearing Assessment:   Hearing Screening    Time taken: 4/8/2025  9:13 AM  Entry User: Amna Pinon CMA  Screening Method: Finger Rub  Finger Rub Result: Pass         GENERAL: Alert, well nourished,in no apparent distress  SKIN: no rashes,no suspicious lesions  HEENT: atraumatic, PERRLA, EOMI, normal lid and conjunctiva  NECK: supple, no jvd, no thyromegaly  LUNGS: clear to auscultation bilaterally, no wheezing/rubs  CARDIO: RRR without murmurs.  No clubbing, cyanosis or edema.  GI: soft non tender nondistended no hepatosplenomegaly, bowel sounds throughout  NEURO: CN II-XII intact, 5/5 strength all extremities  MS: Full ROM  PSYCH: pleasant, appropriate mood and affect    Assessment & Plan:   Dori Oshea is a 84 year old female who presents for a Medicare Assessment.   1. Encounter for subsequent annual wellness visit (AWV) in Medicare patient  Age appropriate health guidance and counseling provided.  Screening labs generally up to date. Body mass index is 22.28 kg/m².    2. Primary hypertension  3. Stage 3a chronic kidney disease (HCC)  At goal blood pressure.  Stable CKD.  Continue amlodipine 5 mg every day.  Labs ordered as below.  - CBC With Differential With Platelet; Future  - Comp Metabolic Panel (14); Future    4. Mixed hyperlipidemia  On statin therapy.  Will check lipid panel.  Continue ezetimibe-simvastatin 10-40 mg every day.  - Lipid Panel; Future    5. Stenosis of right carotid  artery  Asymptomatic.  Will continue statin therapy as above.    6. Diet-controlled diabetes mellitus (HCC)  Diet controlled.  A1c a little higher, now 7.1%.  Continue focus on diet.   - Comp Metabolic Panel (14); Future  - Hemoglobin A1C; Future    7. Subclinical hypothyroidism  No new symptoms, memory issues are from her dementia.  TSH in normal range. Continue levothyroxine 75 mcg qAM.  - TSH and Free T4; Future    8. Age-related osteoporosis without current pathological fracture  No recent falls or fractures.  May consider updated DEXA scan.    9. Gastroesophageal reflux disease, unspecified whether esophagitis present  Stable symptoms.  Continue omeprazole 40 mg every day.    10. Elevated liver enzymes  Mild elevations.  No jaundice or icterus.  Suspect fatty liver.  Monitor for now.    11. History of recurrent UTIs  No recent urinary symptoms.      12. Normocytic anemia  Mild.  CBC ordered as above, will monitor annually for now.    13. Vertigo  Intermittent/chronic issue. Will continue with PRN meclizine.    14. 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  I recommended she message patient's neurology clinic about whether medication/dosage adjustments (on donepezil, memantine) would be warranted.  Next scheduled neurologist office visit is in the summer.    The patient indicates understanding of these issues and agrees to the plan.  Reinforced healthy diet, lifestyle, and exercise.      Return in about 6 months (around 10/8/2025).     Nona King MD, 4/8/2025     Supplementary Documentation:   General Health:  In the past six months, have you lost more than 10 pounds without trying?: (Patient-Rptd) 2 - No  Has your appetite been poor?: (Patient-Rptd) Yes  Type of Diet: (Patient-Rptd) Other  How does the patient maintain a good energy level?: (Patient-Rptd) Other  How would you describe your daily physical activity?:  (Patient-Rptd) None  How would you describe your current health state?: (Patient-Rptd) Fair  How do you maintain positive mental well-being?: (Patient-Rptd) Visiting Family  On a scale of 0 to 10, with 0 being no pain and 10 being severe pain, what is your pain level?: (Patient-Rptd) 1 - (Mild)  In the past six months, have you experienced urine leakage?: (Patient-Rptd) 1-Yes  At any time do you feel concerned for the safety/well-being of yourself and/or your children, in your home or elsewhere?: (Patient-Rptd) No  Have you had any immunizations at another office such as Influenza, Hepatitis B, Tetanus, or Pneumococcal?: (Patient-Rptd) No    Health Maintenance   Topic Date Due    Diabetes Care Dilated Eye Exam  Never done    COVID-19 Vaccine ( season) 2024    Annual Well Visit  2025    Annual Depression Screening  2025    Diabetes Care: Foot Exam (Annual)  Never done    Diabetes Care: Microalb/Creat Ratio (Annual)  Never done    Diabetes Care A1C  2025    Diabetes Care: GFR  2026    Influenza Vaccine  Completed    DEXA Scan  Completed    Fall Risk Screening (Annual)  Completed    Pneumococcal Vaccine: 50+ Years  Completed    Zoster Vaccines  Completed    Meningococcal B Vaccine  Aged Out            [1]   Social History  Tobacco Use   Smoking Status Former    Current packs/day: 0.00    Average packs/day: 1 pack/day for 20.0 years (20.0 ttl pk-yrs)    Types: Cigarettes    Start date: 1960    Quit date: 1980    Years since quittin.6   Smokeless Tobacco Never

## 2025-05-13 ENCOUNTER — LAB ENCOUNTER (OUTPATIENT)
Dept: LAB | Age: 85
End: 2025-05-13
Attending: INTERNAL MEDICINE
Payer: MEDICARE

## 2025-05-13 DIAGNOSIS — E11.9 DIET-CONTROLLED DIABETES MELLITUS (HCC): ICD-10-CM

## 2025-05-13 LAB
CREAT UR-SCNC: 72.6 MG/DL
MICROALBUMIN UR-MCNC: 3.5 MG/DL
MICROALBUMIN/CREAT 24H UR-RTO: 48.2 UG/MG (ref ?–30)

## 2025-05-13 PROCEDURE — 82043 UR ALBUMIN QUANTITATIVE: CPT

## 2025-05-13 PROCEDURE — 82570 ASSAY OF URINE CREATININE: CPT

## 2025-05-14 PROBLEM — E11.21 TYPE 2 DIABETES MELLITUS WITH DIABETIC NEPHROPATHY, WITHOUT LONG-TERM CURRENT USE OF INSULIN (HCC): Chronic | Status: ACTIVE | Noted: 2024-11-03

## 2025-05-14 PROBLEM — E11.21 TYPE 2 DIABETES MELLITUS WITH DIABETIC NEPHROPATHY, WITHOUT LONG-TERM CURRENT USE OF INSULIN (HCC): Status: ACTIVE | Noted: 2024-11-03

## 2025-06-05 DIAGNOSIS — E78.2 MIXED HYPERLIPIDEMIA: Chronic | ICD-10-CM

## 2025-06-05 DIAGNOSIS — I77.9 RIGHT-SIDED CAROTID ARTERY DISEASE, UNSPECIFIED TYPE: Chronic | ICD-10-CM

## 2025-06-06 RX ORDER — EZETIMIBE AND SIMVASTATIN 10; 40 MG/1; MG/1
1 TABLET ORAL NIGHTLY
Qty: 90 TABLET | Refills: 3 | Status: SHIPPED | OUTPATIENT
Start: 2025-06-06

## 2025-06-06 NOTE — TELEPHONE ENCOUNTER
LOV: 4/8/2025 with Dr. King  RTC: 6 months  Last Relevant Labs: 3/21/2025  Filled: 7/19/2024    #90 with 3 refills    No future appointments.

## (undated) DEVICE — KENDALL SCD EXPRESS SLEEVES, KNEE LENGTH, MEDIUM: Brand: KENDALL SCD

## (undated) DEVICE — ADHESIVE MASTISOL 2/3CC VL

## (undated) DEVICE — HEMOCLIP HORIZON MED 002200

## (undated) DEVICE — ABDOMINAL PAD: Brand: DERMACEA

## (undated) DEVICE — STERILE (15.2 X 244CM) POLYETHYLENE TELESCOPICALLY-FOLDED COVER WITH ATTACHED (3CM) NEOGUARD™ TIP: Brand: SURGI-TIP TRANSDUCER COVER

## (undated) DEVICE — SUTURE VICRYL 3-0 CT-2

## (undated) DEVICE — SUTURE SILK 3-0

## (undated) DEVICE — SOLUTION SURG DURA PREP HAZMAT

## (undated) DEVICE — SUTURE PROLENE 6-0 C-1

## (undated) DEVICE — INTENDED TO BE USED TO OCCLUDE, RETRACT AND IDENTIFY ARTERIES, VEINS, TENDONS AND NERVES IN SURGICAL PROCEDURES: Brand: STERION®  VESSEL LOOP

## (undated) DEVICE — CV PACK-LF: Brand: MEDLINE INDUSTRIES, INC.

## (undated) DEVICE — SUTURE VICRYL 5-0 P-3

## (undated) DEVICE — LIGACLIP EXTRA LIGATING CLIP CARTRIDGES: 6 TITANIUM CLIPS/ CARTRIDGE (SMALL): Brand: LIGACLIP

## (undated) DEVICE — 3M™ TEGADERM™ TRANSPARENT FILM DRESSING, 1626W, 4 IN X 4-3/4 IN (10 CM X 12 CM), 50 EACH/CARTON, 4 CARTON/CASE: Brand: 3M™ TEGADERM™

## (undated) DEVICE — SUTURE PROLENE 7-0 BV-1

## (undated) DEVICE — GAUZE SPONGES,12 PLY: Brand: CURITY

## (undated) DEVICE — TRANSPOSAL ULTRAFLEX DUO/QUAD ULTRA CART MANIFOLD

## (undated) DEVICE — SUTURE SILK 4-0

## (undated) DEVICE — SUTURE VICRYL 3-0 SH

## (undated) DEVICE — CONVERTORS STOCKINETTE: Brand: CONVERTORS

## (undated) DEVICE — BREAST-HERNIA-PORT CDS-LF: Brand: MEDLINE INDUSTRIES, INC.

## (undated) DEVICE — SOL  .9 1000ML BTL

## (undated) DEVICE — Device

## (undated) DEVICE — CHLORAPREP 26ML APPLICATOR

## (undated) DEVICE — BASIC DOUBLE BASIN 1-LF: Brand: MEDLINE INDUSTRIES, INC.

## (undated) DEVICE — SUTURE SILK 2-0 SH

## (undated) DEVICE — GLOVE SURG TRIUMPH SZ 8

## (undated) DEVICE — SUTURE PROLENE 2-0 SH

## (undated) DEVICE — TOWEL OR WHITE STRL

## (undated) DEVICE — 3M™ IOBAN™ 2 ANTIMICROBIAL INCISE DRAPE 6650EZ: Brand: IOBAN™ 2

## (undated) DEVICE — Device: Brand: PLUMEPEN

## (undated) DEVICE — SUTURE VICRYL 5-0 RB-1

## (undated) DEVICE — SUTURE PROLENE 6-0 BV-1

## (undated) DEVICE — SUTURE ETHILON 2-0 FS

## (undated) DEVICE — DRAPE HALF 40X58 DYNJP2410

## (undated) DEVICE — 3M™ STERI-STRIP™ REINFORCED ADHESIVE SKIN CLOSURES, R1547, 1/2 IN X 4 IN (12 MM X 100 MM), 6 STRIPS/ENVELOPE: Brand: 3M™ STERI-STRIP™

## (undated) DEVICE — SUTURE SILK 2-0

## (undated) DEVICE — GLOVE BIOGEL M SURG SZ 6-1/2

## (undated) DEVICE — DECANTER BAG 9": Brand: MEDLINE INDUSTRIES, INC.

## (undated) DEVICE — #11 STERILE BLADE: Brand: POLYMER COATED BLADES

## (undated) DEVICE — DYE LYMPHAZURIN ISOSULFAN BLUE

## (undated) NOTE — ED AVS SNAPSHOT
Caleb Purvis   MRN: [id number]    Department:  [facility] Emergency Department   Date of Visit:  10/30/2017           Disclosure     Insurance plans vary and the physician(s) referred by the ER may not be covered by your plan.  Please contac If you have been prescribed any medication(s), please fill your prescription right away and begin taking the medication(s) as directed    If the emergency physician has read X-rays, these will be re-interpreted by a radiologist.  If there is a significant

## (undated) NOTE — LETTER
GENERAL SURGERY    Мария Tadeo MD, FACS, Jessica Spencer. Janey Chua MD, Fransisco Schwartz MD, FACS  Ted Kerr.  Pepe Collazo MD, Wyatt Boss MD, ANTHONY Lopez PA-Cond

## (undated) NOTE — ED AVS SNAPSHOT
Caleb Purvis   MRN: [id number]    Department:  [facility] Emergency Department   Date of Visit:  10/29/2017           Disclosure     Insurance plans vary and the physician(s) referred by the ER may not be covered by your plan.  Please contac If you have been prescribed any medication(s), please fill your prescription right away and begin taking the medication(s) as directed    If the emergency physician has read X-rays, these will be re-interpreted by a radiologist.  If there is a significant

## (undated) NOTE — ED AVS SNAPSHOT
Sarah Damian   MRN: [id number]    Department:  [facility] Emergency Department   Date of Visit:  11/1/2017           Disclosure     Insurance plans vary and the physician(s) referred by the ER may not be covered by your plan.  Please contact If you have been prescribed any medication(s), please fill your prescription right away and begin taking the medication(s) as directed    If the emergency physician has read X-rays, these will be re-interpreted by a radiologist.  If there is a significant

## (undated) NOTE — LETTER
[facility]  [address] [facility], [address] [place] [address]    Consent for Operation    Date: __________________    Time: _______________    1.  I authorize the performance upon Abbi Saldana the following operation:    Procedure(s):  right carotid en videotape. [facility] will not be responsible for storage or maintenance of this tape. 6. For the purpose of advancing medical education, I consent to the admittance of observers to the Operating Room.     7. I authorize the use of any specimen, organs Signature of Patient:   ___________________________    When the patient is a minor or mentally incompetent to give consent:  Signature of person authorized to consent for patient: ___________________________   Relationship to patient: _____________________ drugs/illegal medications). Failure to inform my anesthesiologist about these medicines may increase my risk of anesthetic complications. · If I am allergic to anything or have had a reaction to anesthesia before.     3. I understand how the anesthesia med I have discussed the procedure and information above with the patient (or patient’s representative) and answered their questions. The patient or their representative has agreed to have anesthesia services.     _______________________________________________

## (undated) NOTE — LETTER
05/01/25        Dori Oshea  [address] [facility] [id number]      Dear Dori,    Our records indicate that you have outstanding lab work and or testing that was ordered for you and has not yet been completed:  Orders Placed This Encounter      Microalb/Creat Ratio, Random Urine      To provide you with the best possible care, please complete these orders at your earliest convenience. If you have recently completed these orders please disregard this letter.     If you have any questions please call the office at Dept: [phone].     Thank you,       Nona King MD

## (undated) NOTE — LETTER
[address] [facility] FOR BLOOD OR BLOOD COMPONENTS      In the course of your treatment, it may become necessary to administer a transfusion of blood or blood components.  This form provides basic information concerning this proc explain the alternatives to you if it has not already been done. I,Dori Oshea, have read/had read to me the above. I understand the matters bearing on the decision whether or not to authorize a transfusion of blood or blood components.  I have no

## (undated) NOTE — LETTER
GENERAL SURGERY    Timothy Hayden MD, FACS, Awa Rico. Reji Padilla MD, Abelino Simeon MD, FACS  Escobar Tomas.  Melita Salomon MD, Aracely Lowe MD, Kaden Lyman PACHRISTIAN [facility] PA-C         Assessment    [place]

## (undated) NOTE — LETTER
GENERAL SURGERY    Todd Bruner MD, FACS, Bee Ye. Sarai Cotto MD, Lena Rogel MD, FACS  Raegan Maynard.  Aram Huizar MD, Nickie Rosenthal MD, ANTHONY Lopez PA-C    H[place]itt examination. In the interim she will continue monthly self-examination of the left breast.    Thank you for allowing me to participate in your patient's care.           Winnie Krause M.D.

## (undated) NOTE — LETTER
04/04/18    Dear Denis Bobby,    Assessment   Personal history of malignant neoplasm of breast  (primary encounter diagnosis)  S/P right mastectomy  Family history of breast cancer in female  Ductal carcinoma in situ (DCIS) of right breast  Bennett Qureshi is